# Patient Record
Sex: FEMALE | Race: WHITE | NOT HISPANIC OR LATINO | Employment: OTHER | URBAN - METROPOLITAN AREA
[De-identification: names, ages, dates, MRNs, and addresses within clinical notes are randomized per-mention and may not be internally consistent; named-entity substitution may affect disease eponyms.]

---

## 2017-01-25 ENCOUNTER — OFFICE VISIT (OUTPATIENT)
Dept: MEDICAL GROUP | Facility: PHYSICIAN GROUP | Age: 82
End: 2017-01-25
Payer: MEDICARE

## 2017-01-25 VITALS
OXYGEN SATURATION: 91 % | DIASTOLIC BLOOD PRESSURE: 84 MMHG | HEIGHT: 64 IN | BODY MASS INDEX: 23.56 KG/M2 | HEART RATE: 86 BPM | SYSTOLIC BLOOD PRESSURE: 142 MMHG | WEIGHT: 138 LBS | TEMPERATURE: 98.2 F | RESPIRATION RATE: 18 BRPM

## 2017-01-25 DIAGNOSIS — I27.20 PULMONARY HYPERTENSION (HCC): ICD-10-CM

## 2017-01-25 DIAGNOSIS — G47.34 NOCTURNAL HYPOXIA: ICD-10-CM

## 2017-01-25 DIAGNOSIS — E78.2 MIXED HYPERLIPIDEMIA: ICD-10-CM

## 2017-01-25 DIAGNOSIS — J44.9 CHRONIC OBSTRUCTIVE PULMONARY DISEASE, UNSPECIFIED COPD TYPE (HCC): ICD-10-CM

## 2017-01-25 DIAGNOSIS — I10 ESSENTIAL HYPERTENSION: ICD-10-CM

## 2017-01-25 PROCEDURE — 99214 OFFICE O/P EST MOD 30 MIN: CPT | Performed by: FAMILY MEDICINE

## 2017-01-25 PROCEDURE — G8484 FLU IMMUNIZE NO ADMIN: HCPCS | Performed by: FAMILY MEDICINE

## 2017-01-25 PROCEDURE — G8420 CALC BMI NORM PARAMETERS: HCPCS | Performed by: FAMILY MEDICINE

## 2017-01-25 PROCEDURE — G8432 DEP SCR NOT DOC, RNG: HCPCS | Performed by: FAMILY MEDICINE

## 2017-01-25 PROCEDURE — 1036F TOBACCO NON-USER: CPT | Performed by: FAMILY MEDICINE

## 2017-01-25 PROCEDURE — 4040F PNEUMOC VAC/ADMIN/RCVD: CPT | Performed by: FAMILY MEDICINE

## 2017-01-25 PROCEDURE — 1101F PT FALLS ASSESS-DOCD LE1/YR: CPT | Performed by: FAMILY MEDICINE

## 2017-01-25 NOTE — PROGRESS NOTES
Chief Complaint   Patient presents with   • Follow-Up     labs       HISTORY OF PRESENT ILLNESS: Patient is a 84 y.o. female established patient here today for the following concerns:    1. Mixed hyperlipidemia  Here today for follow up.  She did see cardiology, Dr. Forde, who started Crestor 10 mg.  She is tolerating this well without any noticeable side effects.  No CP.      2. Essential hypertension  Continues on the losartan.  Had some hyperkalemia on one lab test, repeat testing was normal.  She reports that she gets very nervous when she comes to the doctors and that her blood pressure generally has been under 140/90.      3. Pulmonary hypertension (CMS-HCC)  2/2 to severe COPD.  On O2 at night.  Uses it only when she needs it.      4. Chronic obstructive pulmonary disease, unspecified COPD type (CMS-[facility]) 5. Nocturnal hypoxia  Continues on the anoro and albuterol as needed.  No recent exacerbations.  Using O2 only at night or if she is feeling breathless.  Has declined O2 24/7.    Declines further evaluation with pulmonology.        Past Medical, Social, and Family history reviewed and updated in EPIC    Allergies:Review of patient's allergies indicates no known allergies.    Current Outpatient Prescriptions   Medication Sig Dispense Refill   • rosuvastatin (CRESTOR) 10 MG Tab Take 1 Tab by mouth every evening. 90 Tab 3   • Umeclidinium-Vilanterol (ANORO ELLIPTA) 62.5-25 MCG/INH AEROSOL POWDER, BREATH ACTIVATED Inhale 1 Inhalation by mouth every day. 3 Each 3   • albuterol (VENTOLIN OR PROVENTIL) 108 (90 BASE) MCG/ACT Aero Soln inhalation aerosol Inhale 2 Puffs by mouth every 6 hours as needed for Shortness of Breath. 3 Inhaler 3   • losartan (COZAAR) 50 MG Tab Take 1 Tab by mouth every day. 90 Tab 3   • Cholecalciferol (VITAMIN D-3) 5000 UNITS Tab Take  by mouth.     • aspirin 81 MG tablet Take 81 mg by mouth every day.     • FLUZONE HIGH-DOSE 0.5 ML Suspension Prefilled Syringe injection TO BE ADMINISTERED  "BY PHARMACIST FOR IMMUNIZATION  0     No current facility-administered medications for this visit.         ROS:  Review of Systems   Constitutional: Negative for fever, chills, weight loss and malaise/fatigue.   HENT: Negative for ear pain, nosebleeds, congestion, sore throat and neck pain.    Eyes: Negative for blurred vision.   Respiratory: Negative for cough, sputum production, shortness of breath and wheezing.    Cardiovascular: Negative for chest pain, palpitations,  and leg swelling.   Gastrointestinal: Negative for heartburn, nausea, vomiting, diarrhea and abdominal pain.   Genitourinary: Negative for dysuria, urgency and frequency.   Musculoskeletal: Negative for myalgias, back pain and joint pain.   Skin: Negative for rash and itching.   Neurological: Negative for dizziness, tingling, tremors, sensory change, focal weakness and headaches.   Endo/Heme/Allergies: Does not bruise/bleed easily.   Psychiatric/Behavioral: Negative for depression, anxiety, suicidal ideas, insomnia and memory loss.      Exam:  Blood pressure 142/84, pulse 86, temperature 36.8 °C (98.2 °F), resp. rate 18, height 1.626 m (5' 4.02\"), weight 62.596 kg (138 lb), SpO2 91 %.    General:  Well nourished, well developed in NAD  Head is grossly normal.  Neck: Supple without JVD   Pulmonary:  Normal effort.   Cardiovascular: Regular rate  Extremities: no clubbing, cyanosis, or edema.  Psych: affect appropriate      Please note that this dictation was created using voice recognition software. I have made every reasonable attempt to correct obvious errors, but I expect that there are errors of grammar and possibly content that I did not discover before finalizing the note.    Assessment/Plan:  1. Mixed hyperlipidemia  Continue Crestor, labs prior to cards visit in May  - LIPID PROFILE; Future  - COMP METABOLIC PANEL; Future    2. Essential hypertension  Controlled.  Continue losartan    3. Pulmonary hypertension (CMS-HCC)  Continue O2 at night. "     4. Chronic obstructive pulmonary disease, unspecified COPD type (CMS-HCC)  Continue Anoro and albuterol.  Up to date on immunizations per patient received Prevnar 13       5. Nocturnal hypoxia  Continue O2.     Follow up in 6 months, sooner prn.

## 2017-01-25 NOTE — MR AVS SNAPSHOT
"        Nieves Busch   2017 7:40 AM   Office Visit   MRN: [id number]    Department:  [facility]   Dept Phone:  [phone]    Description:  Female : 1932   Provider:  Selene Carrington M.D.           Reason for Visit     Follow-Up labs      Allergies as of 2017     No Known Allergies      You were diagnosed with     Mixed hyperlipidemia   [272.2.ICD-9-CM]       Essential hypertension   [5240306]       Pulmonary hypertension (CMS-HCC)   [927109]       Chronic obstructive pulmonary disease, unspecified COPD type (CMS-HCC)   [9051421]       Nocturnal hypoxia   [945339]         Vital Signs     Blood Pressure Pulse Temperature Respirations Height Weight    142/84 mmHg 86 36.8 °C (98.2 °F) 18 1.626 m (5' 4.02\") 62.596 kg (138 lb)    Body Mass Index Oxygen Saturation Smoking Status             23.68 kg/m2 91% Never Smoker          Basic Information     Date Of Birth Sex Race Ethnicity Preferred Language    1932 Female Unknown Non- English      Your appointments     May 01, 2017  8:15 AM   FOLLOW UP with Korye Forde M.D.   [facility] (--)    [address] [place] [address] [address]  [facility] [address] [phone]            2017  7:00 AM   Established Patient with Selene Carrington M.D.   [address] [address] [phone]           You will be receiving a confirmation call a few days before your appointment from our automated call confirmation system.              Problem List              ICD-10-CM Priority Class Noted - Resolved    Essential hypertension I10   2015 - Present    COPD (chronic obstructive pulmonary disease) (CMS-HCC) J44.9   2015 - Present    Hyperlipidemia E78.5   2015 - Present    Pulmonary hypertension (CMS-HCC) I27.2   2015 - Present    Osteopenia M85.80   2015 - Present    Nocturnal hypoxia G47.34   2016 - Present      "   Health Maintenance        Date Due Completion Dates    IMM DTaP/Tdap/Td Vaccine (1 - Tdap) 12/25/1951 ---    IMM ZOSTER VACCINE 12/25/1992 ---    IMM INFLUENZA (1) 9/1/2016 12/12/2015    IMM PNEUMOCOCCAL 65+ (ADULT) LOW/MEDIUM RISK SERIES (2 of 2 - PCV13) 12/28/2016 12/28/2015    BONE DENSITY 10/16/2020 10/16/2015            Current Immunizations     Influenza Vaccine Adult HD 12/12/2015    Pneumococcal polysaccharide vaccine (PPSV-23) 12/28/2015      Below and/or attached are the medications your provider expects you to take. Review all of your home medications and newly ordered medications with your provider and/or pharmacist. Follow medication instructions as directed by your provider and/or pharmacist. Please keep your medication list with you and share with your provider. Update the information when medications are discontinued, doses are changed, or new medications (including over-the-counter products) are added; and carry medication information at all times in the event of emergency situations     Allergies:  No Known Allergies          Medications  Valid as of: January 25, 2017 -  8:02 AM    Generic Name Brand Name Tablet Size Instructions for use    Albuterol Sulfate (Aero Soln) albuterol 108 (90 BASE) MCG/ACT Inhale 2 Puffs by mouth every 6 hours as needed for Shortness of Breath.        Aspirin (Tab) aspirin 81 MG Take 81 mg by mouth every day.        Cholecalciferol (Tab) Vitamin D-3 5000 UNITS Take  by mouth.        Influenza Vac Split High-Dose (Suspension Prefilled Syringe) FLUZONE HIGH-DOSE 0.5 ML TO BE ADMINISTERED BY PHARMACIST FOR IMMUNIZATION        Losartan Potassium (Tab) COZAAR 50 MG Take 1 Tab by mouth every day.        Rosuvastatin Calcium (Tab) CRESTOR 10 MG Take 1 Tab by mouth every evening.        Umeclidinium-Vilanterol (AEROSOL POWDER, BREATH ACTIVATED) Umeclidinium-Vilanterol 62.5-25 MCG/INH Inhale 1 Inhalation by mouth every day.        .                 Medicines prescribed today  were sent to:     [facility] - [place], [place] - [address]    [address] [address]    Phone: [phone] Fax: [phone]    Open 24 Hours?: No      Medication refill instructions:       If your prescription bottle indicates you have medication refills left, it is not necessary to call your provider’s office. Please contact your pharmacy and they will refill your medication.    If your prescription bottle indicates you do not have any refills left, you may request refills at any time through one of the following ways: The online [email or web address] system (except Urgent Care), by calling your provider’s office, or by asking your pharmacy to contact your provider’s office with a refill request. Medication refills are processed only during regular business hours and may not be available until the next business day. Your provider may request additional information or to have a follow-up visit with you prior to refilling your medication.   *Please Note: Medication refills are assigned a new Rx number when refilled electronically. Your pharmacy may indicate that no refills were authorized even though a new prescription for the same medication is available at the pharmacy. Please request the medicine by name with the pharmacy before contacting your provider for a refill.        Your To Do List     Future Labs/Procedures Complete By Expires    COMP METABOLIC PANEL  As directed 1/25/2018    LIPID PROFILE  As directed 1/25/2018         [email or web address] Status: Patient Declined

## 2017-02-22 NOTE — TELEPHONE ENCOUNTER
Was the patient seen in the last year in this department? Yes     Does patient have an active prescription for medications requested? No     Received Request Via: Pharmacy      Pt met protocol?: Yes, LABS 10/16 OV 1/17

## 2017-04-14 ENCOUNTER — OFFICE VISIT (OUTPATIENT)
Dept: CARDIOLOGY | Facility: MEDICAL CENTER | Age: 82
End: 2017-04-14
Payer: MEDICARE

## 2017-04-14 VITALS
HEART RATE: 94 BPM | OXYGEN SATURATION: 91 % | SYSTOLIC BLOOD PRESSURE: 142 MMHG | HEIGHT: 64 IN | WEIGHT: 134 LBS | BODY MASS INDEX: 22.88 KG/M2 | DIASTOLIC BLOOD PRESSURE: 70 MMHG

## 2017-04-14 DIAGNOSIS — I27.20 PULMONARY HYPERTENSION (HCC): ICD-10-CM

## 2017-04-14 DIAGNOSIS — E78.2 MIXED HYPERLIPIDEMIA: ICD-10-CM

## 2017-04-14 DIAGNOSIS — M85.80 OSTEOPENIA: ICD-10-CM

## 2017-04-14 DIAGNOSIS — G47.34 NOCTURNAL HYPOXIA: ICD-10-CM

## 2017-04-14 DIAGNOSIS — J44.9 CHRONIC OBSTRUCTIVE PULMONARY DISEASE, UNSPECIFIED COPD TYPE (HCC): ICD-10-CM

## 2017-04-14 DIAGNOSIS — I10 ESSENTIAL HYPERTENSION: ICD-10-CM

## 2017-04-14 PROCEDURE — 4040F PNEUMOC VAC/ADMIN/RCVD: CPT | Performed by: INTERNAL MEDICINE

## 2017-04-14 PROCEDURE — G8432 DEP SCR NOT DOC, RNG: HCPCS | Performed by: INTERNAL MEDICINE

## 2017-04-14 PROCEDURE — 1036F TOBACCO NON-USER: CPT | Performed by: INTERNAL MEDICINE

## 2017-04-14 PROCEDURE — 99214 OFFICE O/P EST MOD 30 MIN: CPT | Performed by: INTERNAL MEDICINE

## 2017-04-14 PROCEDURE — 1101F PT FALLS ASSESS-DOCD LE1/YR: CPT | Performed by: INTERNAL MEDICINE

## 2017-04-14 PROCEDURE — G8420 CALC BMI NORM PARAMETERS: HCPCS | Performed by: INTERNAL MEDICINE

## 2017-04-14 RX ORDER — LOSARTAN POTASSIUM 50 MG/1
TABLET ORAL
Qty: 90 TAB | Refills: 1 | Status: SHIPPED | OUTPATIENT
Start: 2017-04-14 | End: 2017-10-12 | Stop reason: SDUPTHER

## 2017-04-14 ASSESSMENT — ENCOUNTER SYMPTOMS
CHILLS: 0
LOSS OF CONSCIOUSNESS: 0
STRIDOR: 0
BRUISES/BLEEDS EASILY: 0
SORE THROAT: 0
WEAKNESS: 0
PALPITATIONS: 0
CLAUDICATION: 0
SPUTUM PRODUCTION: 0
GASTROINTESTINAL NEGATIVE: 1
COUGH: 0
PND: 0
FEVER: 0
SHORTNESS OF BREATH: 0
CARDIOVASCULAR NEGATIVE: 1
DIZZINESS: 0
HEMOPTYSIS: 0
NEUROLOGICAL NEGATIVE: 1
CONSTITUTIONAL NEGATIVE: 1
EYES NEGATIVE: 1
MUSCULOSKELETAL NEGATIVE: 1
WHEEZING: 0
ORTHOPNEA: 0
RESPIRATORY NEGATIVE: 1

## 2017-04-14 NOTE — TELEPHONE ENCOUNTER
Refill X 6 months, sent to pharmacy.Pt. Seen in the last 6 months per protocol.   Lab Results   Component Value Date/Time    SODIUM 136 10/25/2016 08:04 AM    POTASSIUM 4.5 10/25/2016 08:04 AM    CHLORIDE 100 10/25/2016 08:04 AM    CO2 28 10/25/2016 08:04 AM    GLUCOSE 115* 10/25/2016 08:04 AM    BUN 22 10/25/2016 08:04 AM    CREATININE 0.91 10/25/2016 08:04 AM    BUN-CREATININE RATIO 24 10/12/2016 08:34 AM

## 2017-04-14 NOTE — TELEPHONE ENCOUNTER
Was the patient seen in the last year in this department? Yes     Does patient have an active prescription for medications requested? No     Received Request Via: Pharmacy      Pt met protocol?: Yes pt last ov 1/2017   BP Readings from Last 1 Encounters:   04/14/17 142/70

## 2017-04-14 NOTE — Clinical Note
[facility]   [address]  [place] [address]  Phone: [phone]  Fax: [phone]              Nieves Busch  12/25/1932    Encounter Date: 4/14/2017    Korey Forde M.D.          PROGRESS NOTE:  Subjective:   Nieves Busch is a 84 y.o. female who presents today as a follow-up for her WHO type III pulmonary hypertension. She still is likely desaturating during the day and her resting O2 sat is 91% here. She does not wear oxygen during the day or during ambulation but does wear it at night. She's had no chest pain but is somewhat short of breath with exertion. He did start the Crestor and is taking it with no issues. Of note she has never smoked.    Past Medical History   Diagnosis Date   • Essential hypertension 9/25/2015     Past Surgical History   Procedure Laterality Date   • Cataract phaco with iol  8/6/2013     Performed by Wagner Gomez M.D. at [facility]   • Cataract phaco with iol  8/20/2013     Performed by Wagner Gomez M.D. at [facility]     Family History   Problem Relation Age of Onset   • Cancer Daughter    • Cancer Father      Lungs   • Other Father      Tobbacco   • Alcohol/Drug Mother      History   Smoking status   • Never Smoker    Smokeless tobacco   • Never Used     Comment: significant 2nd hand smoke exposure.      No Known Allergies  Outpatient Encounter Prescriptions as of 4/14/2017   Medication Sig Dispense Refill   • Umeclidinium-Vilanterol (ANORO ELLIPTA) 62.5-25 MCG/INH AEROSOL POWDER, BREATH ACTIVATED Take 1 Inhalation by mouth every day. 3 Each 1   • rosuvastatin (CRESTOR) 10 MG Tab Take 1 Tab by mouth every evening. 90 Tab 3   • albuterol (VENTOLIN OR PROVENTIL) 108 (90 BASE) MCG/ACT Aero Soln inhalation aerosol Inhale 2 Puffs by mouth every 6 hours as needed for Shortness of Breath. 3 Inhaler 3   • losartan (COZAAR) 50 MG Tab Take 1 Tab by mouth every day. 90 Tab 3   •  "Cholecalciferol (VITAMIN D-3) 5000 UNITS Tab Take  by mouth.     • aspirin 81 MG tablet Take 81 mg by mouth every day.     • FLUZONE HIGH-DOSE 0.5 ML Suspension Prefilled Syringe injection TO BE ADMINISTERED BY PHARMACIST FOR IMMUNIZATION  0     No facility-administered encounter medications on file as of 4/14/2017.     Review of Systems   Constitutional: Negative.  Negative for fever, chills and malaise/fatigue.   HENT: Negative.  Negative for sore throat.    Eyes: Negative.    Respiratory: Negative.  Negative for cough, hemoptysis, sputum production, shortness of breath, wheezing and stridor.    Cardiovascular: Negative.  Negative for chest pain, palpitations, orthopnea, claudication, leg swelling and PND.   Gastrointestinal: Negative.    Genitourinary: Negative.    Musculoskeletal: Negative.    Skin: Negative.    Neurological: Negative.  Negative for dizziness, loss of consciousness and weakness.   Endo/Heme/Allergies: Negative.  Does not bruise/bleed easily.   All other systems reviewed and are negative.       Objective:   /70 mmHg  Pulse 94  Ht 1.626 m (5' 4.02\")  Wt 60.782 kg (134 lb)  BMI 22.99 kg/m2  SpO2 91%    Physical Exam   Constitutional: She is oriented to person, place, and time. She appears well-developed and well-nourished. No distress.   HENT:   Head: Normocephalic.   Mouth/Throat: Oropharynx is clear and moist.   Eyes: EOM are normal. Pupils are equal, round, and reactive to light. Right eye exhibits no discharge. Left eye exhibits no discharge. No scleral icterus.   Neck: Normal range of motion. Neck supple. No JVD present. No tracheal deviation present.   Cardiovascular: Normal rate, regular rhythm, S1 normal, S2 normal, normal heart sounds, intact distal pulses and normal pulses.  Exam reveals no gallop, no S3, no S4 and no friction rub.    No murmur heard.   No systolic murmur is present    No diastolic murmur is present   Pulses:       Carotid pulses are 2+ on the right side, and " 2+ on the left side.       Radial pulses are 2+ on the right side, and 2+ on the left side.        Dorsalis pedis pulses are 2+ on the right side, and 2+ on the left side.        Posterior tibial pulses are 2+ on the right side, and 2+ on the left side.   Pulmonary/Chest: Effort normal and breath sounds normal. No respiratory distress. She has no wheezes. She has no rales.   Abdominal: Soft. Bowel sounds are normal. She exhibits no distension and no mass. There is no tenderness. There is no rebound and no guarding.   Musculoskeletal: She exhibits no edema.   Neurological: She is alert and oriented to person, place, and time. No cranial nerve deficit.   Skin: Skin is warm and dry. She is not diaphoretic. No pallor.   Psychiatric: She has a normal mood and affect. Her behavior is normal. Judgment and thought content normal.   Nursing note and vitals reviewed.      Assessment:     1. Chronic obstructive pulmonary disease, unspecified COPD type (CMS-HCC)     2. Essential hypertension     3. Mixed hyperlipidemia     4. Pulmonary hypertension (CMS-HCC)     5. Nocturnal hypoxia     6. Osteopenia         Medical Decision Making:  Today's Assessment / Status / Plan:     A 4-year-old female with COPD and no smoking history. We will keep her on the Crestor and her aspirin. I will see her back in 6 months.    Thank for you allowing me to take part in your patient's care, please call should you have any questions or would like to discuss this patient.        Selene Carrington M.D.  [address] [place] [id number]  VIA In Basket

## 2017-04-14 NOTE — PROGRESS NOTES
Subjective:   Nieves Busch is a 84 y.o. female who presents today as a follow-up for her WHO type III pulmonary hypertension. She still is likely desaturating during the day and her resting O2 sat is 91% here. She does not wear oxygen during the day or during ambulation but does wear it at night. She's had no chest pain but is somewhat short of breath with exertion. He did start the Crestor and is taking it with no issues. Of note she has never smoked.    Past Medical History   Diagnosis Date   • Essential hypertension 9/25/2015     Past Surgical History   Procedure Laterality Date   • Cataract phaco with iol  8/6/2013     Performed by Wagner Gomez M.D. at [facility]   • Cataract phaco with iol  8/20/2013     Performed by Wagner Gomez M.D. at [facility]     Family History   Problem Relation Age of Onset   • Cancer Daughter    • Cancer Father      Lungs   • Other Father      Tobbacco   • Alcohol/Drug Mother      History   Smoking status   • Never Smoker    Smokeless tobacco   • Never Used     Comment: significant 2nd hand smoke exposure.      No Known Allergies  Outpatient Encounter Prescriptions as of 4/14/2017   Medication Sig Dispense Refill   • Umeclidinium-Vilanterol (ANORO ELLIPTA) 62.5-25 MCG/INH AEROSOL POWDER, BREATH ACTIVATED Take 1 Inhalation by mouth every day. 3 Each 1   • rosuvastatin (CRESTOR) 10 MG Tab Take 1 Tab by mouth every evening. 90 Tab 3   • albuterol (VENTOLIN OR PROVENTIL) 108 (90 BASE) MCG/ACT Aero Soln inhalation aerosol Inhale 2 Puffs by mouth every 6 hours as needed for Shortness of Breath. 3 Inhaler 3   • losartan (COZAAR) 50 MG Tab Take 1 Tab by mouth every day. 90 Tab 3   • Cholecalciferol (VITAMIN D-3) 5000 UNITS Tab Take  by mouth.     • aspirin 81 MG tablet Take 81 mg by mouth every day.     • FLUZONE HIGH-DOSE 0.5 ML Suspension Prefilled Syringe injection TO BE ADMINISTERED BY PHARMACIST FOR IMMUNIZATION  0     No facility-administered  "encounter medications on file as of 4/14/2017.     Review of Systems   Constitutional: Negative.  Negative for fever, chills and malaise/fatigue.   HENT: Negative.  Negative for sore throat.    Eyes: Negative.    Respiratory: Negative.  Negative for cough, hemoptysis, sputum production, shortness of breath, wheezing and stridor.    Cardiovascular: Negative.  Negative for chest pain, palpitations, orthopnea, claudication, leg swelling and PND.   Gastrointestinal: Negative.    Genitourinary: Negative.    Musculoskeletal: Negative.    Skin: Negative.    Neurological: Negative.  Negative for dizziness, loss of consciousness and weakness.   Endo/Heme/Allergies: Negative.  Does not bruise/bleed easily.   All other systems reviewed and are negative.       Objective:   /70 mmHg  Pulse 94  Ht 1.626 m (5' 4.02\")  Wt 60.782 kg (134 lb)  BMI 22.99 kg/m2  SpO2 91%    Physical Exam   Constitutional: She is oriented to person, place, and time. She appears well-developed and well-nourished. No distress.   HENT:   Head: Normocephalic.   Mouth/Throat: Oropharynx is clear and moist.   Eyes: EOM are normal. Pupils are equal, round, and reactive to light. Right eye exhibits no discharge. Left eye exhibits no discharge. No scleral icterus.   Neck: Normal range of motion. Neck supple. No JVD present. No tracheal deviation present.   Cardiovascular: Normal rate, regular rhythm, S1 normal, S2 normal, normal heart sounds, intact distal pulses and normal pulses.  Exam reveals no gallop, no S3, no S4 and no friction rub.    No murmur heard.   No systolic murmur is present    No diastolic murmur is present   Pulses:       Carotid pulses are 2+ on the right side, and 2+ on the left side.       Radial pulses are 2+ on the right side, and 2+ on the left side.        Dorsalis pedis pulses are 2+ on the right side, and 2+ on the left side.        Posterior tibial pulses are 2+ on the right side, and 2+ on the left side.   Pulmonary/Chest: " Effort normal and breath sounds normal. No respiratory distress. She has no wheezes. She has no rales.   Abdominal: Soft. Bowel sounds are normal. She exhibits no distension and no mass. There is no tenderness. There is no rebound and no guarding.   Musculoskeletal: She exhibits no edema.   Neurological: She is alert and oriented to person, place, and time. No cranial nerve deficit.   Skin: Skin is warm and dry. She is not diaphoretic. No pallor.   Psychiatric: She has a normal mood and affect. Her behavior is normal. Judgment and thought content normal.   Nursing note and vitals reviewed.      Assessment:     1. Chronic obstructive pulmonary disease, unspecified COPD type (CMS-HCC)     2. Essential hypertension     3. Mixed hyperlipidemia     4. Pulmonary hypertension (CMS-HCC)     5. Nocturnal hypoxia     6. Osteopenia         Medical Decision Making:  Today's Assessment / Status / Plan:     A 4-year-old female with COPD and no smoking history. We will keep her on the Crestor and her aspirin. I will see her back in 6 months.    Thank for you allowing me to take part in your patient's care, please call should you have any questions or would like to discuss this patient.

## 2017-04-14 NOTE — MR AVS SNAPSHOT
"        Nieves ROEL Busch   2017 9:00 AM   Office Visit   MRN: [id number]    Department:  [facility]   Dept Phone:  [phone]    Description:  Female : 1932   Provider:  Korey Forde M.D.           Reason for Visit     Follow-Up           Allergies as of 2017     No Known Allergies      You were diagnosed with     Chronic obstructive pulmonary disease, unspecified COPD type (CMS-[facility])   [3956156]       Essential hypertension   [2100981]       Mixed hyperlipidemia   [272.2.ICD-9-CM]       Pulmonary hypertension (CMS-[facility])   [818668]       Nocturnal hypoxia   [823766]       Osteopenia   [487107]         Vital Signs     Blood Pressure Pulse Height Weight Body Mass Index Oxygen Saturation    142/70 mmHg 94 1.626 m (5' 4.02\") 60.782 kg (134 lb) 22.99 kg/m2 91%    Smoking Status                   Never Smoker            Basic Information     Date Of Birth Sex Race Ethnicity Preferred Language    1932 Female White Non- English      Your appointments     2017  7:00 AM   Established Patient with Selene Carrington M.D.   [facility] [address] [id number] [phone]           You will be receiving a confirmation call a few days before your appointment from our automated call confirmation system.              Problem List              ICD-10-CM Priority Class Noted - Resolved    Essential hypertension I10   2015 - Present    COPD (chronic obstructive pulmonary disease) (CMS-HCC) J44.9   2015 - Present    Hyperlipidemia E78.5   2015 - Present    Pulmonary hypertension (CMS-HCC) I27.2   2015 - Present    Osteopenia M85.80   2015 - Present    Nocturnal hypoxia G47.34   2016 - Present      Health Maintenance        Date Due Completion Dates    IMM DTaP/Tdap/Td Vaccine (1 - Tdap) 1951 ---    IMM ZOSTER VACCINE 1992 ---    IMM PNEUMOCOCCAL 65+ (ADULT) LOW/MEDIUM RISK SERIES (2 of 2 - " PCV13) 12/28/2016 12/28/2015    BONE DENSITY 10/16/2020 10/16/2015            Current Immunizations     Influenza Vaccine Adult HD 12/12/2015    Pneumococcal polysaccharide vaccine (PPSV-23) 12/28/2015      Below and/or attached are the medications your provider expects you to take. Review all of your home medications and newly ordered medications with your provider and/or pharmacist. Follow medication instructions as directed by your provider and/or pharmacist. Please keep your medication list with you and share with your provider. Update the information when medications are discontinued, doses are changed, or new medications (including over-the-counter products) are added; and carry medication information at all times in the event of emergency situations     Allergies:  No Known Allergies          Medications  Valid as of: April 14, 2017 -  9:26 AM    Generic Name Brand Name Tablet Size Instructions for use    Albuterol Sulfate (Aero Soln) albuterol 108 (90 BASE) MCG/ACT Inhale 2 Puffs by mouth every 6 hours as needed for Shortness of Breath.        Aspirin (Tab) aspirin 81 MG Take 81 mg by mouth every day.        Cholecalciferol (Tab) Vitamin D-3 5000 UNITS Take  by mouth.        Influenza Vac Split High-Dose (Suspension Prefilled Syringe) FLUZONE HIGH-DOSE 0.5 ML TO BE ADMINISTERED BY PHARMACIST FOR IMMUNIZATION        Losartan Potassium (Tab) COZAAR 50 MG Take 1 Tab by mouth every day.        Rosuvastatin Calcium (Tab) CRESTOR 10 MG Take 1 Tab by mouth every evening.        Umeclidinium-Vilanterol (AEROSOL POWDER, BREATH ACTIVATED) Umeclidinium-Vilanterol 62.5-25 MCG/INH Take 1 Inhalation by mouth every day.        .                 Medicines prescribed today were sent to:     [email or web address] - [place], [place] - [address] [place] [address]    Phone: [phone] Fax: [phone]    Open 24 Hours?: No      Medication refill instructions:       If your  prescription bottle indicates you have medication refills left, it is not necessary to call your provider’s office. Please contact your pharmacy and they will refill your medication.    If your prescription bottle indicates you do not have any refills left, you may request refills at any time through one of the following ways: The online [employer] system (except Urgent Care), by calling your provider’s office, or by asking your pharmacy to contact your provider’s office with a refill request. Medication refills are processed only during regular business hours and may not be available until the next business day. Your provider may request additional information or to have a follow-up visit with you prior to refilling your medication.   *Please Note: Medication refills are assigned a new Rx number when refilled electronically. Your pharmacy may indicate that no refills were authorized even though a new prescription for the same medication is available at the pharmacy. Please request the medicine by name with the pharmacy before contacting your provider for a refill.           Servicefulhart Status: Patient Declined

## 2017-07-20 ENCOUNTER — HOSPITAL ENCOUNTER (OUTPATIENT)
Dept: LAB | Facility: MEDICAL CENTER | Age: 82
End: 2017-07-20
Attending: FAMILY MEDICINE
Payer: MEDICARE

## 2017-07-20 DIAGNOSIS — E78.2 MIXED HYPERLIPIDEMIA: ICD-10-CM

## 2017-07-20 LAB
ALBUMIN SERPL BCP-MCNC: 4 G/DL (ref 3.2–4.9)
ALBUMIN/GLOB SERPL: 1.1 G/DL
ALP SERPL-CCNC: 98 U/L (ref 30–99)
ALT SERPL-CCNC: 21 U/L (ref 2–50)
ANION GAP SERPL CALC-SCNC: 10 MMOL/L (ref 0–11.9)
AST SERPL-CCNC: 22 U/L (ref 12–45)
BILIRUB SERPL-MCNC: 0.7 MG/DL (ref 0.1–1.5)
BUN SERPL-MCNC: 23 MG/DL (ref 8–22)
CALCIUM SERPL-MCNC: 10.1 MG/DL (ref 8.5–10.5)
CHLORIDE SERPL-SCNC: 100 MMOL/L (ref 96–112)
CHOLEST SERPL-MCNC: 187 MG/DL (ref 100–199)
CO2 SERPL-SCNC: 27 MMOL/L (ref 20–33)
CREAT SERPL-MCNC: 0.94 MG/DL (ref 0.5–1.4)
GFR SERPL CREATININE-BSD FRML MDRD: 57 ML/MIN/1.73 M 2
GLOBULIN SER CALC-MCNC: 3.5 G/DL (ref 1.9–3.5)
GLUCOSE SERPL-MCNC: 103 MG/DL (ref 65–99)
HDLC SERPL-MCNC: 95 MG/DL
LDLC SERPL CALC-MCNC: 76 MG/DL
POTASSIUM SERPL-SCNC: 4.9 MMOL/L (ref 3.6–5.5)
PROT SERPL-MCNC: 7.5 G/DL (ref 6–8.2)
SODIUM SERPL-SCNC: 137 MMOL/L (ref 135–145)
TRIGL SERPL-MCNC: 78 MG/DL (ref 0–149)

## 2017-07-20 PROCEDURE — 36415 COLL VENOUS BLD VENIPUNCTURE: CPT

## 2017-07-20 PROCEDURE — 80053 COMPREHEN METABOLIC PANEL: CPT

## 2017-07-20 PROCEDURE — 80061 LIPID PANEL: CPT

## 2017-07-21 ENCOUNTER — TELEPHONE (OUTPATIENT)
Dept: MEDICAL GROUP | Facility: PHYSICIAN GROUP | Age: 82
End: 2017-07-21

## 2017-07-25 ENCOUNTER — OFFICE VISIT (OUTPATIENT)
Dept: MEDICAL GROUP | Facility: PHYSICIAN GROUP | Age: 82
End: 2017-07-25
Payer: MEDICARE

## 2017-07-25 VITALS
HEART RATE: 98 BPM | HEIGHT: 64 IN | BODY MASS INDEX: 23.05 KG/M2 | WEIGHT: 135 LBS | SYSTOLIC BLOOD PRESSURE: 130 MMHG | TEMPERATURE: 98.2 F | OXYGEN SATURATION: 91 % | RESPIRATION RATE: 18 BRPM | DIASTOLIC BLOOD PRESSURE: 70 MMHG

## 2017-07-25 DIAGNOSIS — J96.11 CHRONIC RESPIRATORY FAILURE WITH HYPOXIA (HCC): ICD-10-CM

## 2017-07-25 DIAGNOSIS — J44.9 CHRONIC OBSTRUCTIVE PULMONARY DISEASE, UNSPECIFIED COPD TYPE (HCC): ICD-10-CM

## 2017-07-25 DIAGNOSIS — E78.2 MIXED HYPERLIPIDEMIA: ICD-10-CM

## 2017-07-25 DIAGNOSIS — Z78.0 POSTMENOPAUSAL ESTROGEN DEFICIENCY: ICD-10-CM

## 2017-07-25 DIAGNOSIS — G47.34 NOCTURNAL HYPOXIA: ICD-10-CM

## 2017-07-25 DIAGNOSIS — I10 ESSENTIAL HYPERTENSION: ICD-10-CM

## 2017-07-25 DIAGNOSIS — I27.20 PULMONARY HYPERTENSION (HCC): ICD-10-CM

## 2017-07-25 DIAGNOSIS — M85.89 OSTEOPENIA OF MULTIPLE SITES: ICD-10-CM

## 2017-07-25 PROBLEM — J96.10 CHRONIC RESPIRATORY FAILURE (HCC): Status: ACTIVE | Noted: 2017-07-25

## 2017-07-25 PROCEDURE — 99213 OFFICE O/P EST LOW 20 MIN: CPT | Performed by: FAMILY MEDICINE

## 2017-07-25 ASSESSMENT — PATIENT HEALTH QUESTIONNAIRE - PHQ9: CLINICAL INTERPRETATION OF PHQ2 SCORE: 0

## 2017-07-25 NOTE — PROGRESS NOTES
Chief Complaint   Patient presents with   • Follow-Up     labs       HISTORY OF PRESENT ILLNESS: Patient is a 84 y.o. female established patient here today for the following concerns:    This is a very pleasant 84 year old female with history of HTN, Pulmonary HTN 2/2 to COPD with nocturnal hypoxia and baseline dyspnea as well hyperlipidemia.  She reports feeling well at this time with no recent COPD exacerbations.  She has recently seen her cardiologist who had appropriately suggested that Ms. Busch may benefit from oxygen 24/7 as she is likely desating with ambulation.  She reports that she is not ready for this and does not want to be tested.  She continues O2 at night.  She has been adherent to her inhaled medication although she has been a bit frightened to use her rescue inhalers as she does not know how they will affect her.  She has not had any exacerbations, denies any wheezing, cough, increase in sputum.  She reports mostly just dyspnea which she feels is relieved with resting.      In addition she is tolerating the Crestor without myalgias.  Lipids are well controlled on most recent labs.  She continues on ASA 81 mg and her losartan for BP control.      Renal function appears to be at baseline.  She continues to work on her hydration.    Past Medical, Social, and Family history reviewed and updated in EPIC    Allergies:Review of patient's allergies indicates no known allergies.    Current Outpatient Prescriptions   Medication Sig Dispense Refill   • losartan (COZAAR) 50 MG Tab TAKE 1 TABLET DAILY 90 Tab 1   • Umeclidinium-Vilanterol (ANORO ELLIPTA) 62.5-25 MCG/INH AEROSOL POWDER, BREATH ACTIVATED Take 1 Inhalation by mouth every day. 3 Each 1   • rosuvastatin (CRESTOR) 10 MG Tab Take 1 Tab by mouth every evening. 90 Tab 3   • albuterol (VENTOLIN OR PROVENTIL) 108 (90 BASE) MCG/ACT Aero Soln inhalation aerosol Inhale 2 Puffs by mouth every 6 hours as needed for Shortness of Breath. 3 Inhaler 3   •  "Cholecalciferol (VITAMIN D-3) 5000 UNITS Tab Take  by mouth.     • aspirin 81 MG tablet Take 81 mg by mouth every day.     • FLUZONE HIGH-DOSE 0.5 ML Suspension Prefilled Syringe injection TO BE ADMINISTERED BY PHARMACIST FOR IMMUNIZATION  0     No current facility-administered medications for this visit.         ROS:  Review of Systems   Constitutional: Negative for fever, chills, weight loss and malaise/fatigue.   HENT: Negative for ear pain, nosebleeds, congestion, sore throat and neck pain.    Eyes: Negative for blurred vision.   Respiratory: Negative for cough, sputum production, shortness of breath and wheezing.    Cardiovascular: Negative for chest pain, palpitations,  and leg swelling.   Gastrointestinal: Negative for heartburn, nausea, vomiting, diarrhea and abdominal pain.   Genitourinary: Negative for dysuria, urgency and frequency.   Musculoskeletal: Negative for myalgias, back pain and joint pain.   Skin: Negative for rash and itching.   Neurological: Negative for dizziness, tingling, tremors, sensory change, focal weakness and headaches.   Endo/Heme/Allergies: Does not bruise/bleed easily.   Psychiatric/Behavioral: Negative for depression, anxiety, suicidal ideas, insomnia and memory loss.      Exam:  Blood pressure 130/70, pulse 98, temperature 36.8 °C (98.2 °F), resp. rate 18, height 1.626 m (5' 4.02\"), weight 61.236 kg (135 lb), SpO2 91 %.    General:  Well nourished, well developed in NAD  Head is grossly normal.  Neck: Supple without JVD   Pulmonary:  Normal effort. diminshed breath sounds bilaterally, no wheezing.  Cardiovascular: Regular rate  Extremities: no clubbing, cyanosis, or edema.  Psych: affect appropriate      Please note that this dictation was created using voice recognition software. I have made every reasonable attempt to correct obvious errors, but I expect that there are errors of grammar and possibly content that I did not discover before finalizing the " note.    Assessment/Plan:  1. Postmenopausal estrogen deficiency  Check bone density   - DS-BONE DENSITY STUDY (DEXA); Future    2. Chronic obstructive pulmonary disease, unspecified COPD type (CMS-HCC)  Continue inhaled medications, flu vaccine in the fall and late winter.     3. Essential hypertension  Continue losartan     4. Chronic respiratory failure with hypoxia (CMS-HCC)  Continue O2 at night.  Offered to test for need for daytime use, patient declines.     5. Nocturnal hypoxia  Continue O2    6. Pulmonary hypertension (CMS-HCC)  Continue O2, inhaled medications     7. Mixed hyperlipidemia  Continue Crestor.    8. Osteopenia of multiple sites  Check bone density, continue Vit D, weight bearing activity.     6 month follow up

## 2017-07-25 NOTE — MR AVS SNAPSHOT
"        Nieves ROEL BakerBusch   2017 7:00 AM   Office Visit   MRN: [id number]    Department:  [facility]   Dept Phone:  [phone]    Description:  Female : 1932   Provider:  Selene Carrington M.D.           Reason for Visit     Follow-Up labs      Allergies as of 2017     No Known Allergies      You were diagnosed with     Postmenopausal estrogen deficiency   [314403]         Vital Signs     Blood Pressure Pulse Temperature Respirations Height Weight    130/70 mmHg 98 36.8 °C (98.2 °F) 18 1.626 m (5' 4.02\") 61.236 kg (135 lb)    Body Mass Index Oxygen Saturation Smoking Status             23.16 kg/m2 91% Never Smoker          Basic Information     Date Of Birth Sex Race Ethnicity Preferred Language    1932 Female White Non- English      Your appointments     2017  7:45 AM   FOLLOW UP with Korey Forde M.D.   [facility] (--)    [address], [address]  [facility] [id number] [phone]              Problem List              ICD-10-CM Priority Class Noted - Resolved    Essential hypertension I10   2015 - Present    COPD (chronic obstructive pulmonary disease) (CMS-HCC) J44.9   2015 - Present    Hyperlipidemia E78.5   2015 - Present    Pulmonary hypertension (CMS-[facility]) I27.2   2015 - Present    Osteopenia M85.80   2015 - Present    Nocturnal hypoxia G47.34   2016 - Present      Health Maintenance        Date Due Completion Dates    IMM DTaP/Tdap/Td Vaccine (1 - Tdap) 1951 ---    IMM ZOSTER VACCINE 1992 ---    IMM PNEUMOCOCCAL 65+ (ADULT) LOW/MEDIUM RISK SERIES (2 of 2 - PCV13) 2016    IMM INFLUENZA (1) 2015    BONE DENSITY 10/16/2020 10/16/2015            Current Immunizations     Influenza Vaccine Adult HD 2015    Pneumococcal polysaccharide vaccine (PPSV-23) 2015      Below and/or attached are the medications your provider expects you to " take. Review all of your home medications and newly ordered medications with your provider and/or pharmacist. Follow medication instructions as directed by your provider and/or pharmacist. Please keep your medication list with you and share with your provider. Update the information when medications are discontinued, doses are changed, or new medications (including over-the-counter products) are added; and carry medication information at all times in the event of emergency situations     Allergies:  No Known Allergies          Medications  Valid as of: July 25, 2017 -  7:21 AM    Generic Name Brand Name Tablet Size Instructions for use    Albuterol Sulfate (Aero Soln) albuterol 108 (90 BASE) MCG/ACT Inhale 2 Puffs by mouth every 6 hours as needed for Shortness of Breath.        Aspirin (Tab) aspirin 81 MG Take 81 mg by mouth every day.        Cholecalciferol (Tab) Vitamin D-3 5000 UNITS Take  by mouth.        Influenza Vac Split High-Dose (Suspension Prefilled Syringe) FLUZONE HIGH-DOSE 0.5 ML TO BE ADMINISTERED BY PHARMACIST FOR IMMUNIZATION        Losartan Potassium (Tab) COZAAR 50 MG TAKE 1 TABLET DAILY        Rosuvastatin Calcium (Tab) CRESTOR 10 MG Take 1 Tab by mouth every evening.        Umeclidinium-Vilanterol (AEROSOL POWDER, BREATH ACTIVATED) Umeclidinium-Vilanterol 62.5-25 MCG/INH Take 1 Inhalation by mouth every day.        .                 Medicines prescribed today were sent to:     [facility] - [address] [address]    Phone: [phone] Fax: [phone]    Open 24 Hours?: No      Medication refill instructions:       If your prescription bottle indicates you have medication refills left, it is not necessary to call your provider’s office. Please contact your pharmacy and they will refill your medication.    If your prescription bottle indicates you do not have any refills left, you may request refills at any time  through one of the following ways: The online [employer] system (except Urgent Care), by calling your provider’s office, or by asking your pharmacy to contact your provider’s office with a refill request. Medication refills are processed only during regular business hours and may not be available until the next business day. Your provider may request additional information or to have a follow-up visit with you prior to refilling your medication.   *Please Note: Medication refills are assigned a new Rx number when refilled electronically. Your pharmacy may indicate that no refills were authorized even though a new prescription for the same medication is available at the pharmacy. Please request the medicine by name with the pharmacy before contacting your provider for a refill.        Your To Do List     Future Labs/Procedures Complete By Expires    DS-BONE DENSITY STUDY (DEXA)  As directed 7/25/2018         1[employer] Status: Patient Declined

## 2017-08-09 ENCOUNTER — OFFICE VISIT (OUTPATIENT)
Dept: MEDICAL GROUP | Facility: PHYSICIAN GROUP | Age: 82
End: 2017-08-09
Payer: MEDICARE

## 2017-08-09 VITALS
DIASTOLIC BLOOD PRESSURE: 80 MMHG | RESPIRATION RATE: 20 BRPM | BODY MASS INDEX: 23.05 KG/M2 | OXYGEN SATURATION: 83 % | SYSTOLIC BLOOD PRESSURE: 132 MMHG | HEIGHT: 64 IN | WEIGHT: 135 LBS | TEMPERATURE: 98.1 F | HEART RATE: 106 BPM

## 2017-08-09 DIAGNOSIS — J96.11 CHRONIC RESPIRATORY FAILURE WITH HYPOXIA (HCC): ICD-10-CM

## 2017-08-09 DIAGNOSIS — J44.9 CHRONIC OBSTRUCTIVE PULMONARY DISEASE, UNSPECIFIED COPD TYPE (HCC): ICD-10-CM

## 2017-08-09 DIAGNOSIS — G47.34 NOCTURNAL HYPOXIA: ICD-10-CM

## 2017-08-09 DIAGNOSIS — I27.20 PULMONARY HYPERTENSION (HCC): ICD-10-CM

## 2017-08-09 PROCEDURE — 99214 OFFICE O/P EST MOD 30 MIN: CPT | Performed by: FAMILY MEDICINE

## 2017-08-09 NOTE — PROGRESS NOTES
Chief Complaint   Patient presents with   • Other     O2 check       HISTORY OF PRESENT ILLNESS: Patient is a 84 y.o. female established patient here today for the following concerns:    1. Chronic respiratory failure with hypoxia (CMS-HCC)  2. Pulmonary hypertension (CMS-HCC)  3. Nocturnal hypoxia  4. Chronic obstructive pulmonary disease, unspecified COPD type (CMS-HCC)  Nieves is here now for follow up and is more willing to consider daytime use of oxygen.  She reports that she is finding that she is getting breathless quickly with exertion.  She is using Oxygen at night.  She has several questions on how to use the canula and how she would need to use the oxygen.  She has hx of COPD, pulmonary HTN and nocturnal hypoxia as well as respiratory failure on ambulation.        Past Medical, Social, and Family history reviewed and updated in EPIC    Allergies:Review of patient's allergies indicates no known allergies.    Current Outpatient Prescriptions   Medication Sig Dispense Refill   • losartan (COZAAR) 50 MG Tab TAKE 1 TABLET DAILY 90 Tab 1   • Umeclidinium-Vilanterol (ANORO ELLIPTA) 62.5-25 MCG/INH AEROSOL POWDER, BREATH ACTIVATED Take 1 Inhalation by mouth every day. 3 Each 1   • rosuvastatin (CRESTOR) 10 MG Tab Take 1 Tab by mouth every evening. 90 Tab 3   • albuterol (VENTOLIN OR PROVENTIL) 108 (90 BASE) MCG/ACT Aero Soln inhalation aerosol Inhale 2 Puffs by mouth every 6 hours as needed for Shortness of Breath. 3 Inhaler 3   • Cholecalciferol (VITAMIN D-3) 5000 UNITS Tab Take  by mouth.     • aspirin 81 MG tablet Take 81 mg by mouth every day.     • FLUZONE HIGH-DOSE 0.5 ML Suspension Prefilled Syringe injection TO BE ADMINISTERED BY PHARMACIST FOR IMMUNIZATION  0     No current facility-administered medications for this visit.         ROS:  Review of Systems   Constitutional: Negative for fever, chills, weight loss and malaise/fatigue.   HENT: Negative for ear pain, nosebleeds, congestion, sore throat and  "neck pain.    Eyes: Negative for blurred vision.   Respiratory: Negative for cough, sputum production, shortness of breath and wheezing.    Cardiovascular: Negative for chest pain, palpitations,  and leg swelling.   Gastrointestinal: Negative for heartburn, nausea, vomiting, diarrhea and abdominal pain.   Genitourinary: Negative for dysuria, urgency and frequency.   Musculoskeletal: Negative for myalgias, back pain and joint pain.   Skin: Negative for rash and itching.   Neurological: Negative for dizziness, tingling, tremors, sensory change, focal weakness and headaches.   Endo/Heme/Allergies: Does not bruise/bleed easily.   Psychiatric/Behavioral: Negative for depression, anxiety, suicidal ideas, insomnia and memory loss.      Exam:  Blood pressure 132/80, pulse 106, temperature 36.7 °C (98.1 °F), resp. rate 20, height 1.626 m (5' 4.02\"), weight 61.236 kg (135 lb), SpO2 83 %.    General:  Well nourished, well developed in NAD  Head is grossly normal.  Neck: Supple without JVD   Pulmonary:  Normal effort.   Cardiovascular: Regular rate  Extremities: no clubbing, cyanosis, or edema.  Psych: affect appropriate    O2 sat declines to 83% walking <30 seconds.      Please note that this dictation was created using voice recognition software. I have made every reasonable attempt to correct obvious errors, but I expect that there are errors of grammar and possibly content that I did not discover before finalizing the note.    Assessment/Plan:  1. Chronic respiratory failure with hypoxia (CMS-HCC)  2. Pulmonary hypertension (CMS-HCC)  3. Nocturnal hypoxia  4. Chronic obstructive pulmonary disease, unspecified COPD type (CMS-HCC)  Start daytime oxygen.  Patient would benefit from a portable unit that would be easier to carry.    Orders today for 24/7 O2 at 2 LPM  3 month follow up          "

## 2017-08-09 NOTE — MR AVS SNAPSHOT
"        Nieves Bakernett   2017 3:20 PM   Office Visit   MRN: [id number]    Department:  [facility]   Dept Phone:  [phone]    Description:  Female : 1932   Provider:  Selene Carrington M.D.           Reason for Visit     Other O2 check      Allergies as of 2017     No Known Allergies      You were diagnosed with     Chronic respiratory failure with hypoxia (CMS-[facility])   [336117]       Pulmonary hypertension (CMS-HCC)   [774265]       Nocturnal hypoxia   [221405]       Chronic obstructive pulmonary disease, unspecified COPD type (CMS-[facility])   [0326571]         Vital Signs     Blood Pressure Pulse Temperature Respirations Height Weight    132/80 mmHg 106 36.7 °C (98.1 °F) 20 1.626 m (5' 4.02\") 61.236 kg (135 lb)    Body Mass Index Oxygen Saturation Smoking Status             23.16 kg/m2 83% Never Smoker          Basic Information     Date Of Birth Sex Race Ethnicity Preferred Language    1932 Female White Non- English      Your appointments     2017  7:45 AM   FOLLOW UP with Korey Forde M.D.   [facility] (--)    [address], [facility] [address]  [facility] [id number] [phone]              Problem List              ICD-10-CM Priority Class Noted - Resolved    Essential hypertension I10   2015 - Present    COPD (chronic obstructive pulmonary disease) (CMS-HCC) J44.9   2015 - Present    Hyperlipidemia E78.5   2015 - Present    Pulmonary hypertension (CMS-HCC) I27.2   2015 - Present    Osteopenia M85.80   2015 - Present    Nocturnal hypoxia G47.34   2016 - Present    Chronic respiratory failure (CMS-HCC) J96.10   2017 - Present      Health Maintenance        Date Due Completion Dates    IMM DTaP/Tdap/Td Vaccine (1 - Tdap) 1951 ---    IMM ZOSTER VACCINE 1992 ---    IMM PNEUMOCOCCAL 65+ (ADULT) LOW/MEDIUM RISK SERIES (2 of 2 - PCV13) 2016    IMM INFLUENZA (1) 2017 " 12/12/2015    BONE DENSITY 10/16/2020 10/16/2015            Current Immunizations     Influenza Vaccine Adult HD 12/12/2015    Pneumococcal polysaccharide vaccine (PPSV-23) 12/28/2015      Below and/or attached are the medications your provider expects you to take. Review all of your home medications and newly ordered medications with your provider and/or pharmacist. Follow medication instructions as directed by your provider and/or pharmacist. Please keep your medication list with you and share with your provider. Update the information when medications are discontinued, doses are changed, or new medications (including over-the-counter products) are added; and carry medication information at all times in the event of emergency situations     Allergies:  No Known Allergies          Medications  Valid as of: August 09, 2017 -  3:46 PM    Generic Name Brand Name Tablet Size Instructions for use    Albuterol Sulfate (Aero Soln) albuterol 108 (90 BASE) MCG/ACT Inhale 2 Puffs by mouth every 6 hours as needed for Shortness of Breath.        Aspirin (Tab) aspirin 81 MG Take 81 mg by mouth every day.        Cholecalciferol (Tab) Vitamin D-3 5000 UNITS Take  by mouth.        Influenza Vac Split High-Dose (Suspension Prefilled Syringe) FLUZONE HIGH-DOSE 0.5 ML TO BE ADMINISTERED BY PHARMACIST FOR IMMUNIZATION        Losartan Potassium (Tab) COZAAR 50 MG TAKE 1 TABLET DAILY        Rosuvastatin Calcium (Tab) CRESTOR 10 MG Take 1 Tab by mouth every evening.        Umeclidinium-Vilanterol (AEROSOL POWDER, BREATH ACTIVATED) Umeclidinium-Vilanterol 62.5-25 MCG/INH Take 1 Inhalation by mouth every day.        .                 Medicines prescribed today were sent to:     [facility] - [address] [address]    Phone: [phone] Fax: [phone]    Open 24 Hours?: No      Medication refill instructions:       If your prescription bottle indicates you have  medication refills left, it is not necessary to call your provider’s office. Please contact your pharmacy and they will refill your medication.    If your prescription bottle indicates you do not have any refills left, you may request refills at any time through one of the following ways: The online [employer] system (except Urgent Care), by calling your provider’s office, or by asking your pharmacy to contact your provider’s office with a refill request. Medication refills are processed only during regular business hours and may not be available until the next business day. Your provider may request additional information or to have a follow-up visit with you prior to refilling your medication.   *Please Note: Medication refills are assigned a new Rx number when refilled electronically. Your pharmacy may indicate that no refills were authorized even though a new prescription for the same medication is available at the pharmacy. Please request the medicine by name with the pharmacy before contacting your provider for a refill.           [email or web address] Status: Patient Declined

## 2017-08-22 NOTE — TELEPHONE ENCOUNTER
Was the patient seen in the last year in this department? Yes     Does patient have an active prescription for medications requested? No     Received Request Via: Pharmacy      Pt met protocol?: Yes, OV earlier this month regarding dx

## 2017-10-13 RX ORDER — LOSARTAN POTASSIUM 50 MG/1
TABLET ORAL
Qty: 90 TAB | Refills: 1 | Status: SHIPPED | OUTPATIENT
Start: 2017-10-13 | End: 2018-04-11 | Stop reason: SDUPTHER

## 2017-10-13 NOTE — TELEPHONE ENCOUNTER
Refill X 6 months, sent to pharmacy.Pt. Seen in the last 6 months per protocol.   Lab Results   Component Value Date/Time    SODIUM 137 07/20/2017 09:17 AM    POTASSIUM 4.9 07/20/2017 09:17 AM    CHLORIDE 100 07/20/2017 09:17 AM    CO2 27 07/20/2017 09:17 AM    GLUCOSE 103 (H) 07/20/2017 09:17 AM    BUN 23 (H) 07/20/2017 09:17 AM    CREATININE 0.94 07/20/2017 09:17 AM    BUNCREATRAT 24 10/12/2016 08:34 AM

## 2017-10-30 DIAGNOSIS — E78.2 MIXED HYPERLIPIDEMIA: ICD-10-CM

## 2017-10-31 RX ORDER — ROSUVASTATIN CALCIUM 10 MG/1
TABLET, COATED ORAL
Qty: 90 TAB | Refills: 2 | Status: SHIPPED | OUTPATIENT
Start: 2017-10-31 | End: 2018-07-28 | Stop reason: SDUPTHER

## 2017-11-06 ENCOUNTER — HOSPITAL ENCOUNTER (OUTPATIENT)
Dept: RADIOLOGY | Facility: MEDICAL CENTER | Age: 82
End: 2017-11-06
Attending: FAMILY MEDICINE
Payer: MEDICARE

## 2017-11-06 DIAGNOSIS — Z78.0 POSTMENOPAUSAL ESTROGEN DEFICIENCY: ICD-10-CM

## 2017-11-06 DIAGNOSIS — M85.9 DISORDER OF BONE DENSITY AND STRUCTURE, UNSPECIFIED: ICD-10-CM

## 2017-11-06 PROCEDURE — 77080 DXA BONE DENSITY AXIAL: CPT

## 2017-11-09 ENCOUNTER — TELEPHONE (OUTPATIENT)
Dept: MEDICAL GROUP | Facility: PHYSICIAN GROUP | Age: 82
End: 2017-11-09

## 2017-11-09 NOTE — TELEPHONE ENCOUNTER
----- Message from Selene Carrington M.D. sent at 11/9/2017  9:53 AM PST -----  Bone density shows some increase in the bone density in some places but some loss.  We will continue with the same treatment and recheck in 2 years.

## 2017-11-10 ENCOUNTER — OFFICE VISIT (OUTPATIENT)
Dept: MEDICAL GROUP | Facility: PHYSICIAN GROUP | Age: 82
End: 2017-11-10
Payer: MEDICARE

## 2017-11-10 VITALS
HEIGHT: 64 IN | TEMPERATURE: 97.7 F | BODY MASS INDEX: 22.98 KG/M2 | RESPIRATION RATE: 20 BRPM | OXYGEN SATURATION: 92 % | WEIGHT: 134.6 LBS | SYSTOLIC BLOOD PRESSURE: 148 MMHG | DIASTOLIC BLOOD PRESSURE: 80 MMHG | HEART RATE: 98 BPM

## 2017-11-10 DIAGNOSIS — J96.11 CHRONIC RESPIRATORY FAILURE WITH HYPOXIA (HCC): ICD-10-CM

## 2017-11-10 DIAGNOSIS — G47.34 NOCTURNAL HYPOXIA: ICD-10-CM

## 2017-11-10 DIAGNOSIS — J44.9 CHRONIC OBSTRUCTIVE PULMONARY DISEASE, UNSPECIFIED COPD TYPE (HCC): ICD-10-CM

## 2017-11-10 DIAGNOSIS — I27.20 PULMONARY HYPERTENSION (HCC): ICD-10-CM

## 2017-11-10 DIAGNOSIS — I10 ESSENTIAL HYPERTENSION: ICD-10-CM

## 2017-11-10 DIAGNOSIS — M81.0 AGE-RELATED OSTEOPOROSIS WITHOUT CURRENT PATHOLOGICAL FRACTURE: ICD-10-CM

## 2017-11-10 DIAGNOSIS — Z23 NEED FOR VACCINATION FOR STREP PNEUMONIAE: ICD-10-CM

## 2017-11-10 DIAGNOSIS — E78.2 MIXED HYPERLIPIDEMIA: ICD-10-CM

## 2017-11-10 PROCEDURE — 99214 OFFICE O/P EST MOD 30 MIN: CPT | Mod: 25 | Performed by: FAMILY MEDICINE

## 2017-11-10 PROCEDURE — G0009 ADMIN PNEUMOCOCCAL VACCINE: HCPCS | Performed by: FAMILY MEDICINE

## 2017-11-10 PROCEDURE — 90670 PCV13 VACCINE IM: CPT | Performed by: FAMILY MEDICINE

## 2017-11-10 RX ORDER — ALENDRONATE SODIUM 70 MG/1
70 TABLET ORAL
Qty: 12 TAB | Refills: 3 | Status: SHIPPED | OUTPATIENT
Start: 2017-11-10 | End: 2018-09-24 | Stop reason: SDUPTHER

## 2017-11-10 NOTE — PROGRESS NOTES
Chief Complaint   Patient presents with   • Follow-Up     bone density       HISTORY OF PRESENT ILLNESS: Patient is a 84 y.o. female established patient here today for the following concerns:    1. Age-related osteoporosis without current pathological fracture  Here today for follow up.  Bone density is showing further bone loss and increase in fracture risk.  She has not been treated with any medications, but does take Ca++ and Vit D on occasion.      2. Chronic respiratory failure with hypoxia (CMS-[facility])  3. Chronic obstructive pulmonary disease, unspecified COPD type (CMS-[facility])  4. Nocturnal hypoxia  7. Pulmonary hypertension  Nieves has hx of COPD, chronic respiratory failure with nocturnal hypoxia and need for oxygen with exertion.  She reports that she is using the concentrator at night and while sitting at home, but that often she does not bring the tanks into the stores as she is concerned about falling with them as they are quite heavy.  She would be more apt to use them if it was a smaller device.     4. Essential hypertension  Patient continues on the losartan for BP control.  Denies any CP, SOB.  No palpitations.      5. Need for vaccination for Strep pneumoniae  Due for Prevnar 13        8. Mixed hyperlipidemia  Continues on Crestor daily with good tolerability and no myalgias.  No CP.       Past Medical, Social, and Family history reviewed and updated in EPIC    Allergies:Review of patient's allergies indicates no known allergies.    Current Outpatient Prescriptions   Medication Sig Dispense Refill   • alendronate (FOSAMAX) 70 MG Tab Take 1 Tab by mouth every 7 days. 12 Tab 3   • rosuvastatin (CRESTOR) 10 MG Tab TAKE 1 TABLET EVERY EVENING 90 Tab 2   • losartan (COZAAR) 50 MG Tab TAKE 1 TABLET DAILY 90 Tab 1   • ANORO ELLIPTA 62.5-25 MCG/INH AEROSOL POWDER, BREATH ACTIVATED USE 1 INHALATION DAILY. 180 Each 0   • albuterol (VENTOLIN OR PROVENTIL) 108 (90 BASE) MCG/ACT Aero Soln inhalation aerosol Inhale 2  "Puffs by mouth every 6 hours as needed for Shortness of Breath. 3 Inhaler 3   • Cholecalciferol (VITAMIN D-3) 5000 UNITS Tab Take  by mouth.     • aspirin 81 MG tablet Take 81 mg by mouth every day.       No current facility-administered medications for this visit.          ROS:  Review of Systems   Constitutional: Negative for fever, chills, weight loss and malaise/fatigue.   HENT: Negative for ear pain, nosebleeds, congestion, sore throat and neck pain.    Eyes: Negative for blurred vision.   Respiratory: Negative for cough, sputum production, shortness of breath and wheezing.    Cardiovascular: Negative for chest pain, palpitations,  and leg swelling.   Gastrointestinal: Negative for heartburn, nausea, vomiting, diarrhea and abdominal pain.   Genitourinary: Negative for dysuria, urgency and frequency.   Musculoskeletal: Negative for myalgias, back pain and joint pain.   Skin: Negative for rash and itching.   Neurological: Negative for dizziness, tingling, tremors, sensory change, focal weakness and headaches.   Endo/Heme/Allergies: Does not bruise/bleed easily.   Psychiatric/Behavioral: Negative for depression, anxiety, suicidal ideas, insomnia and memory loss.      Exam:  Blood pressure 148/80, pulse 98, temperature 36.5 °C (97.7 °F), resp. rate 20, height 1.626 m (5' 4.02\"), weight 61.1 kg (134 lb 9.6 oz), SpO2 92 %.    General:  Well nourished, well developed in NAD  Head is grossly normal.  Neck: Supple without JVD   Pulmonary:  Normal effort.   Cardiovascular: Regular rate  Extremities: no clubbing, cyanosis, or edema.  Psych: affect appropriate      Please note that this dictation was created using voice recognition software. I have made every reasonable attempt to correct obvious errors, but I expect that there are errors of grammar and possibly content that I did not discover before finalizing the note.    Assessment/Plan:  1. Age-related osteoporosis without current pathological fracture  Start 5 years " of   - alendronate (FOSAMAX) 70 MG Tab; Take 1 Tab by mouth every 7 days.  Dispense: 12 Tab; Refill: 3    2. Chronic respiratory failure with hypoxia (CMS-HCC)  Continue O2, patient would benefit from small portable concentrator because of Osteoporosis and concerning for pulling the larger tank behind her.       3. Chronic obstructive pulmonary disease, unspecified COPD type (CMS-HCC)  Continue inhaled medication.     4. Essential hypertension  Continue losartan.     5. Need for vaccination for Strep pneumoniae  Continue   - Prevnar 13 PCV-13    6. Nocturnal hypoxia  Continue O2    7. Pulmonary hypertension  Secondary to COPD, continue inhaled medicines and oxygen.     8. Mixed hyperlipidemia  Continue Crestor    3-6 month follow up

## 2017-11-17 ENCOUNTER — OFFICE VISIT (OUTPATIENT)
Dept: CARDIOLOGY | Facility: MEDICAL CENTER | Age: 82
End: 2017-11-17
Payer: MEDICARE

## 2017-11-17 VITALS
HEART RATE: 94 BPM | HEIGHT: 64 IN | DIASTOLIC BLOOD PRESSURE: 90 MMHG | BODY MASS INDEX: 23.22 KG/M2 | SYSTOLIC BLOOD PRESSURE: 170 MMHG | WEIGHT: 136 LBS

## 2017-11-17 DIAGNOSIS — I27.20 PULMONARY HYPERTENSION (HCC): ICD-10-CM

## 2017-11-17 DIAGNOSIS — J44.9 CHRONIC OBSTRUCTIVE PULMONARY DISEASE, UNSPECIFIED COPD TYPE (HCC): ICD-10-CM

## 2017-11-17 DIAGNOSIS — E78.2 MIXED HYPERLIPIDEMIA: ICD-10-CM

## 2017-11-17 DIAGNOSIS — I10 ESSENTIAL HYPERTENSION: ICD-10-CM

## 2017-11-17 DIAGNOSIS — J96.11 CHRONIC RESPIRATORY FAILURE WITH HYPOXIA (HCC): ICD-10-CM

## 2017-11-17 PROCEDURE — 99214 OFFICE O/P EST MOD 30 MIN: CPT | Performed by: INTERNAL MEDICINE

## 2017-11-17 ASSESSMENT — ENCOUNTER SYMPTOMS
HEMOPTYSIS: 0
PALPITATIONS: 0
RESPIRATORY NEGATIVE: 1
ORTHOPNEA: 0
CONSTITUTIONAL NEGATIVE: 1
SHORTNESS OF BREATH: 0
WHEEZING: 0
CHILLS: 0
SPUTUM PRODUCTION: 0
BRUISES/BLEEDS EASILY: 0
MUSCULOSKELETAL NEGATIVE: 1
COUGH: 0
FEVER: 0
DIZZINESS: 0
WEAKNESS: 0
STRIDOR: 0
CLAUDICATION: 0
NEUROLOGICAL NEGATIVE: 1
LOSS OF CONSCIOUSNESS: 0
GASTROINTESTINAL NEGATIVE: 1
PND: 0
SORE THROAT: 0
CARDIOVASCULAR NEGATIVE: 1
EYES NEGATIVE: 1

## 2017-11-17 NOTE — LETTER
[facility]   [address]  [place] [address]  Phone: [phone]  Fax: [phone]              Nieves Busch  12/25/1932    Encounter Date: 11/17/2017    Korey Forde M.D.          PROGRESS NOTE:  Subjective:   Nieves Busch is a 84 y.o. female who presents today as a follow-up for her pulmonary hypertension likely induced by COPD. Since I've seen her last time she's had no changes in her clinical status. She thinks her blood pressure is mildly elevated today because she was in a rush to get in here. She says she checks her blood pressures at home which average about 130 systolic. She's had no lower extremity edema and she continues to be aasymptomatic.    Past Medical History:   Diagnosis Date   • Essential hypertension 9/25/2015     Past Surgical History:   Procedure Laterality Date   • CATARACT PHACO WITH IOL  8/20/2013    Performed by Wagner Gomez M.D. at [facility]   • CATARACT PHACO WITH IOL  8/6/2013    Performed by Wagner Gomez M.D. at [facility]     Family History   Problem Relation Age of Onset   • Cancer Daughter    • Cancer Father      Lungs   • Other Father      Tobbacco   • Alcohol/Drug Mother      History   Smoking Status   • Never Smoker   Smokeless Tobacco   • Never Used     Comment: significant 2nd hand smoke exposure.      No Known Allergies  Outpatient Encounter Prescriptions as of 11/17/2017   Medication Sig Dispense Refill   • alendronate (FOSAMAX) 70 MG Tab Take 1 Tab by mouth every 7 days. 12 Tab 3   • rosuvastatin (CRESTOR) 10 MG Tab TAKE 1 TABLET EVERY EVENING 90 Tab 2   • losartan (COZAAR) 50 MG Tab TAKE 1 TABLET DAILY 90 Tab 1   • ANORO ELLIPTA 62.5-25 MCG/INH AEROSOL POWDER, BREATH ACTIVATED USE 1 INHALATION DAILY. 180 Each 0   • albuterol (VENTOLIN OR PROVENTIL) 108 (90 BASE) MCG/ACT Aero Soln inhalation aerosol Inhale 2 Puffs by mouth every 6 hours as needed for Shortness  "of Breath. 3 Inhaler 3   • Cholecalciferol (VITAMIN D-3) 5000 UNITS Tab Take  by mouth.     • aspirin 81 MG tablet Take 81 mg by mouth every day.       No facility-administered encounter medications on file as of 11/17/2017.      Review of Systems   Constitutional: Negative.  Negative for chills, fever and malaise/fatigue.   HENT: Negative.  Negative for sore throat.    Eyes: Negative.    Respiratory: Negative.  Negative for cough, hemoptysis, sputum production, shortness of breath, wheezing and stridor.    Cardiovascular: Negative.  Negative for chest pain, palpitations, orthopnea, claudication, leg swelling and PND.   Gastrointestinal: Negative.    Genitourinary: Negative.    Musculoskeletal: Negative.    Skin: Negative.    Neurological: Negative.  Negative for dizziness, loss of consciousness and weakness.   Endo/Heme/Allergies: Negative.  Does not bruise/bleed easily.   All other systems reviewed and are negative.       Objective:   BP (!) 170/90   Pulse 94   Ht 1.626 m (5' 4\")   Wt 61.7 kg (136 lb)   BMI 23.34 kg/m²      Physical Exam   Constitutional: She is oriented to person, place, and time. She appears well-developed and well-nourished. No distress.   HENT:   Head: Normocephalic.   Mouth/Throat: Oropharynx is clear and moist.   Eyes: EOM are normal. Pupils are equal, round, and reactive to light. Right eye exhibits no discharge. Left eye exhibits no discharge. No scleral icterus.   Neck: Normal range of motion. Neck supple. No JVD present. No tracheal deviation present.   Cardiovascular: Normal rate, regular rhythm, S1 normal, S2 normal, normal heart sounds, intact distal pulses and normal pulses.  Exam reveals no gallop, no S3, no S4 and no friction rub.    No murmur heard.   No systolic murmur is present    No diastolic murmur is present   Pulses:       Carotid pulses are 2+ on the right side, and 2+ on the left side.       Radial pulses are 2+ on the right side, and 2+ on the left side.        " Dorsalis pedis pulses are 2+ on the right side, and 2+ on the left side.        Posterior tibial pulses are 2+ on the right side, and 2+ on the left side.   Pulmonary/Chest: Effort normal and breath sounds normal. No respiratory distress. She has no wheezes. She has no rales.   Abdominal: Soft. Bowel sounds are normal. She exhibits no distension and no mass. There is no tenderness. There is no rebound and no guarding.   Musculoskeletal: She exhibits no edema.   Neurological: She is alert and oriented to person, place, and time. No cranial nerve deficit.   Skin: Skin is warm and dry. She is not diaphoretic. No pallor.   Psychiatric: She has a normal mood and affect. Her behavior is normal. Judgment and thought content normal.   Nursing note and vitals reviewed.      Assessment:     1. Chronic respiratory failure with hypoxia (CMS-HCC)     2. Essential hypertension     3. Chronic obstructive pulmonary disease, unspecified COPD type (CMS-HCC)  ECHOCARDIOGRAM COMP W/O CONT   4. Pulmonary hypertension  ECHOCARDIOGRAM COMP W/O CONT   5. Mixed hyperlipidemia  ECHOCARDIOGRAM COMP W/O CONT       Medical Decision Making:  Today's Assessment / Status / Plan:     84-year-old female with COPD and pulmonary hypertension likely WHO type III  From her long-standing COPD.  We will go ahead and recheck her echocardiogram today to look for progression of her PA pressures. If they're elevated we'll consider starting medication for this. We'll see her back in 3 months to discuss this further.    Thank for you allowing me to take part in your patient's care, please call should you have any questions or would like to discuss this patient.      Selene Carrington M.D.  [address] [place] [id number]  VIA In Basket

## 2017-11-17 NOTE — PROGRESS NOTES
Subjective:   Nieves Busch is a 84 y.o. female who presents today as a follow-up for her pulmonary hypertension likely induced by COPD. Since I've seen her last time she's had no changes in her clinical status. She thinks her blood pressure is mildly elevated today because she was in a rush to get in here. She says she checks her blood pressures at home which average about 130 systolic. She's had no lower extremity edema and she continues to be aasymptomatic.    Past Medical History:   Diagnosis Date   • Essential hypertension 9/25/2015     Past Surgical History:   Procedure Laterality Date   • CATARACT PHACO WITH IOL  8/20/2013    Performed by Wagner Gomez M.D. at [facility]   • CATARACT PHACO WITH IOL  8/6/2013    Performed by Wagner Gomez M.D. at [facility]     Family History   Problem Relation Age of Onset   • Cancer Daughter    • Cancer Father      Lungs   • Other Father      Tobbacco   • Alcohol/Drug Mother      History   Smoking Status   • Never Smoker   Smokeless Tobacco   • Never Used     Comment: significant 2nd hand smoke exposure.      No Known Allergies  Outpatient Encounter Prescriptions as of 11/17/2017   Medication Sig Dispense Refill   • alendronate (FOSAMAX) 70 MG Tab Take 1 Tab by mouth every 7 days. 12 Tab 3   • rosuvastatin (CRESTOR) 10 MG Tab TAKE 1 TABLET EVERY EVENING 90 Tab 2   • losartan (COZAAR) 50 MG Tab TAKE 1 TABLET DAILY 90 Tab 1   • ANORO ELLIPTA 62.5-25 MCG/INH AEROSOL POWDER, BREATH ACTIVATED USE 1 INHALATION DAILY. 180 Each 0   • albuterol (VENTOLIN OR PROVENTIL) 108 (90 BASE) MCG/ACT Aero Soln inhalation aerosol Inhale 2 Puffs by mouth every 6 hours as needed for Shortness of Breath. 3 Inhaler 3   • Cholecalciferol (VITAMIN D-3) 5000 UNITS Tab Take  by mouth.     • aspirin 81 MG tablet Take 81 mg by mouth every day.       No facility-administered encounter medications on file as of 11/17/2017.      Review of Systems   Constitutional:  "Negative.  Negative for chills, fever and malaise/fatigue.   HENT: Negative.  Negative for sore throat.    Eyes: Negative.    Respiratory: Negative.  Negative for cough, hemoptysis, sputum production, shortness of breath, wheezing and stridor.    Cardiovascular: Negative.  Negative for chest pain, palpitations, orthopnea, claudication, leg swelling and PND.   Gastrointestinal: Negative.    Genitourinary: Negative.    Musculoskeletal: Negative.    Skin: Negative.    Neurological: Negative.  Negative for dizziness, loss of consciousness and weakness.   Endo/Heme/Allergies: Negative.  Does not bruise/bleed easily.   All other systems reviewed and are negative.       Objective:   BP (!) 170/90   Pulse 94   Ht 1.626 m (5' 4\")   Wt 61.7 kg (136 lb)   BMI 23.34 kg/m²     Physical Exam   Constitutional: She is oriented to person, place, and time. She appears well-developed and well-nourished. No distress.   HENT:   Head: Normocephalic.   Mouth/Throat: Oropharynx is clear and moist.   Eyes: EOM are normal. Pupils are equal, round, and reactive to light. Right eye exhibits no discharge. Left eye exhibits no discharge. No scleral icterus.   Neck: Normal range of motion. Neck supple. No JVD present. No tracheal deviation present.   Cardiovascular: Normal rate, regular rhythm, S1 normal, S2 normal, normal heart sounds, intact distal pulses and normal pulses.  Exam reveals no gallop, no S3, no S4 and no friction rub.    No murmur heard.   No systolic murmur is present    No diastolic murmur is present   Pulses:       Carotid pulses are 2+ on the right side, and 2+ on the left side.       Radial pulses are 2+ on the right side, and 2+ on the left side.        Dorsalis pedis pulses are 2+ on the right side, and 2+ on the left side.        Posterior tibial pulses are 2+ on the right side, and 2+ on the left side.   Pulmonary/Chest: Effort normal and breath sounds normal. No respiratory distress. She has no wheezes. She has no " rales.   Abdominal: Soft. Bowel sounds are normal. She exhibits no distension and no mass. There is no tenderness. There is no rebound and no guarding.   Musculoskeletal: She exhibits no edema.   Neurological: She is alert and oriented to person, place, and time. No cranial nerve deficit.   Skin: Skin is warm and dry. She is not diaphoretic. No pallor.   Psychiatric: She has a normal mood and affect. Her behavior is normal. Judgment and thought content normal.   Nursing note and vitals reviewed.      Assessment:     1. Chronic respiratory failure with hypoxia (CMS-[facility])     2. Essential hypertension     3. Chronic obstructive pulmonary disease, unspecified COPD type (CMS-[facility])  ECHOCARDIOGRAM COMP W/O CONT   4. Pulmonary hypertension  ECHOCARDIOGRAM COMP W/O CONT   5. Mixed hyperlipidemia  ECHOCARDIOGRAM COMP W/O CONT       Medical Decision Making:  Today's Assessment / Status / Plan:     84-year-old female with COPD and pulmonary hypertension likely WHO type III  From her long-standing COPD.  We will go ahead and recheck her echocardiogram today to look for progression of her PA pressures. If they're elevated we'll consider starting medication for this. We'll see her back in 3 months to discuss this further.    Thank for you allowing me to take part in your patient's care, please call should you have any questions or would like to discuss this patient.

## 2017-12-27 ENCOUNTER — HOSPITAL ENCOUNTER (OUTPATIENT)
Dept: CARDIOLOGY | Facility: MEDICAL CENTER | Age: 82
End: 2017-12-27
Attending: INTERNAL MEDICINE
Payer: MEDICARE

## 2017-12-27 DIAGNOSIS — J44.9 CHRONIC OBSTRUCTIVE PULMONARY DISEASE, UNSPECIFIED COPD TYPE (HCC): ICD-10-CM

## 2017-12-27 DIAGNOSIS — E78.2 MIXED HYPERLIPIDEMIA: ICD-10-CM

## 2017-12-27 DIAGNOSIS — I27.20 PULMONARY HYPERTENSION (HCC): ICD-10-CM

## 2017-12-27 LAB
LV EJECT FRACT  99904: 60
LV EJECT FRACT MOD 4C 99902: 63.94

## 2017-12-27 PROCEDURE — 93306 TTE W/DOPPLER COMPLETE: CPT

## 2017-12-27 PROCEDURE — 93306 TTE W/DOPPLER COMPLETE: CPT | Mod: 26 | Performed by: INTERNAL MEDICINE

## 2018-01-10 ENCOUNTER — TELEPHONE (OUTPATIENT)
Dept: CARDIOLOGY | Facility: MEDICAL CENTER | Age: 83
End: 2018-01-10

## 2018-01-10 NOTE — TELEPHONE ENCOUNTER
ECHOCARDIOGRAM COMP W/O CONT   Order: [id number]   Status:  Final result   Visible to patient:  No (Inaccessible in MyChart) Dx:  Mixed hyperlipidemia; Pulmonary hyper...   Notes Recorded by Korey Forde M.D. on 1/9/2018 at 7:18 AM PST  Echo is looking better!, PLease let them know  ------    Notes Recorded by Graciela Mcfadden R.N. on 12/27/2017 at 4:23 PM PST  On recall to see you 5/2018     Letter sent.

## 2018-04-11 NOTE — TELEPHONE ENCOUNTER
Was the patient seen in the last year in this department? Yes     Does patient have an active prescription for medications requested? No     Received Request Via: Pharmacy      Pt met protocol?: Yes, ov pcp 11/10/17 ov heart  11/17/17 bp 170/90 appt with heart  5/15/18

## 2018-04-12 RX ORDER — LOSARTAN POTASSIUM 50 MG/1
50 TABLET ORAL DAILY
Qty: 90 TAB | Refills: 0 | Status: SHIPPED | OUTPATIENT
Start: 2018-04-12 | End: 2018-07-11 | Stop reason: SDUPTHER

## 2018-04-12 NOTE — TELEPHONE ENCOUNTER
Refill X 3months, sent to pharmacy.Pt. Seen in the last 6 months per protocol.  Due for follow up appt. Please schedule.  Lab Results   Component Value Date/Time    SODIUM 137 07/20/2017 09:17 AM    POTASSIUM 4.9 07/20/2017 09:17 AM    CHLORIDE 100 07/20/2017 09:17 AM    CO2 27 07/20/2017 09:17 AM    GLUCOSE 103 (H) 07/20/2017 09:17 AM    BUN 23 (H) 07/20/2017 09:17 AM    CREATININE 0.94 07/20/2017 09:17 AM    BUNCREATRAT 24 10/12/2016 08:34 AM

## 2018-05-08 ENCOUNTER — OFFICE VISIT (OUTPATIENT)
Dept: MEDICAL GROUP | Facility: PHYSICIAN GROUP | Age: 83
End: 2018-05-08
Payer: MEDICARE

## 2018-05-08 VITALS
BODY MASS INDEX: 22.26 KG/M2 | TEMPERATURE: 98.8 F | OXYGEN SATURATION: 90 % | HEART RATE: 98 BPM | WEIGHT: 130.4 LBS | HEIGHT: 64 IN | DIASTOLIC BLOOD PRESSURE: 78 MMHG | RESPIRATION RATE: 22 BRPM | SYSTOLIC BLOOD PRESSURE: 140 MMHG

## 2018-05-08 DIAGNOSIS — C44.309 SKIN CANCER OF FOREHEAD: ICD-10-CM

## 2018-05-08 DIAGNOSIS — J96.11 CHRONIC RESPIRATORY FAILURE WITH HYPOXIA (HCC): ICD-10-CM

## 2018-05-08 DIAGNOSIS — J44.9 CHRONIC OBSTRUCTIVE PULMONARY DISEASE, UNSPECIFIED COPD TYPE (HCC): ICD-10-CM

## 2018-05-08 DIAGNOSIS — Z85.828 HISTORY OF SKIN CANCER: ICD-10-CM

## 2018-05-08 DIAGNOSIS — I27.20 PULMONARY HYPERTENSION (HCC): ICD-10-CM

## 2018-05-08 PROCEDURE — 99214 OFFICE O/P EST MOD 30 MIN: CPT | Performed by: FAMILY MEDICINE

## 2018-05-08 NOTE — PROGRESS NOTES
Chief Complaint   Patient presents with   • Wound Infection     on rt side of forehead       HISTORY OF PRESENT ILLNESS: Patient is a 85 y.o. female established patient here today for the following concerns:    1. Skin cancer of forehead  2. History of skin cancer  Nievse comes in today with skin lesion for the last many months if not more than 1 year that is now bleeding and itching.  SHe does have hx of a basal cell in similar location on the other side of the forehead.      3. Chronic respiratory failure with hypoxia (HCC)  4. Chronic obstructive pulmonary disease, unspecified COPD type (HCC)  5. Pulmonary hypertension (HCC)  Continues on inhaled medications, oxygen at night and at home while resting/walking.  Using tanks.  Trying to determine if it is financial worth while to purchase portable concentrator.  No recent illnesses.       Past Medical, Social, and Family history reviewed and updated in EPIC    Allergies:Patient has no known allergies.    Current Outpatient Prescriptions   Medication Sig Dispense Refill   • losartan (COZAAR) 50 MG Tab Take 1 Tab by mouth every day. 90 Tab 0   • ANORO ELLIPTA 62.5-25 MCG/INH AEROSOL POWDER, BREATH ACTIVATED inhaler USE 1 INHALATION DAILY. 180 Each 0   • alendronate (FOSAMAX) 70 MG Tab Take 1 Tab by mouth every 7 days. 12 Tab 3   • rosuvastatin (CRESTOR) 10 MG Tab TAKE 1 TABLET EVERY EVENING 90 Tab 2   • albuterol (VENTOLIN OR PROVENTIL) 108 (90 BASE) MCG/ACT Aero Soln inhalation aerosol Inhale 2 Puffs by mouth every 6 hours as needed for Shortness of Breath. 3 Inhaler 3   • Cholecalciferol (VITAMIN D-3) 5000 UNITS Tab Take  by mouth.     • aspirin 81 MG tablet Take 81 mg by mouth every day.       No current facility-administered medications for this visit.          ROS:  Review of Systems   Constitutional: Negative for fever, chills, weight loss and malaise/fatigue.   HENT: Negative for ear pain, nosebleeds, congestion, sore throat and neck pain.    Eyes: Negative for  "blurred vision.   Respiratory: Negative for cough, sputum production, shortness of breath and wheezing.    Cardiovascular: Negative for chest pain, palpitations,  and leg swelling.   Gastrointestinal: Negative for heartburn, nausea, vomiting, diarrhea and abdominal pain.   Genitourinary: Negative for dysuria, urgency and frequency.   Musculoskeletal: Negative for myalgias, back pain and joint pain.   Skin: Negative for rash and itching.   Neurological: Negative for dizziness, tingling, tremors, sensory change, focal weakness and headaches.   Endo/Heme/Allergies: Does not bruise/bleed easily.   Psychiatric/Behavioral: Negative for depression, anxiety, suicidal ideas, insomnia and memory loss.      Exam:  Blood pressure 140/78, pulse 98, temperature 37.1 °C (98.8 °F), resp. rate (!) 22, height 1.626 m (5' 4\"), weight 59.1 kg (130 lb 6.4 oz), SpO2 90 %.    General:  Well nourished, well developed in NAD  Head is grossly normal.  Neck: Supple without JVD   Pulmonary:  Normal effort.   Cardiovascular: Regular rate  Extremities: no clubbing, cyanosis, or edema.  Psych: affect appropriate  Skin: pink pearly lesion with rolled edges, telangiectasia and central necrosis consistent with basal cell    Please note that this dictation was created using voice recognition software. I have made every reasonable attempt to correct obvious errors, but I expect that there are errors of grammar and possibly content that I did not discover before finalizing the note.    Assessment/Plan:  1. Skin cancer of forehead  - REFERRAL TO DERMATOLOGY    2. History of skin cancer  - REFERRAL TO DERMATOLOGY    3. Chronic respiratory failure with hypoxia (HCC)  4. Chronic obstructive pulmonary disease, unspecified COPD type (HCC)  5. Pulmonary hypertension (HCC)  Continue O2, Continue inhaled medications, annual flu vaccine.      Follow up in 3 months.         "

## 2018-05-14 ENCOUNTER — OFFICE VISIT (OUTPATIENT)
Dept: DERMATOLOGY | Facility: IMAGING CENTER | Age: 83
End: 2018-05-14
Payer: MEDICARE

## 2018-05-14 ENCOUNTER — HOSPITAL ENCOUNTER (OUTPATIENT)
Facility: MEDICAL CENTER | Age: 83
End: 2018-05-14
Attending: DERMATOLOGY
Payer: MEDICARE

## 2018-05-14 VITALS — TEMPERATURE: 97.8 F | BODY MASS INDEX: 22.2 KG/M2 | HEIGHT: 64 IN | WEIGHT: 130 LBS

## 2018-05-14 DIAGNOSIS — D48.5 NEOPLASM OF UNCERTAIN BEHAVIOR OF SKIN: ICD-10-CM

## 2018-05-14 PROCEDURE — 88305 TISSUE EXAM BY PATHOLOGIST: CPT

## 2018-05-14 PROCEDURE — 11100 PR BIOPSY OF SKIN LESION: CPT | Performed by: DERMATOLOGY

## 2018-05-14 ASSESSMENT — ENCOUNTER SYMPTOMS
CHILLS: 0
FEVER: 0

## 2018-05-14 NOTE — PROGRESS NOTES
Dermatology New Patient Visit    Chief Complaint   Patient presents with   • Establish Care       Subjective:     HPI:   Nieves Busch is a 85 y.o. female presenting for    HPI: non healing sore   Location: forehead   Time present: 4 months   Painful lesion: No  Itching lesion: No  Enlarging lesion: Yes  Anything make it better or worse? No     History of skin cancer: Yes, Details:  Basal 20 years ago , left forehead  History of biopsies:Yes, Details: above  History of blistering/severe sunburns:Yes, Details:  During youth  Family history of skin cancer:Yes, Details: unknown  Family history of atypical moles:No          Past Medical History:   Diagnosis Date   • Essential hypertension 9/25/2015       Current Outpatient Prescriptions on File Prior to Visit   Medication Sig Dispense Refill   • losartan (COZAAR) 50 MG Tab Take 1 Tab by mouth every day. 90 Tab 0   • ANORO ELLIPTA 62.5-25 MCG/INH AEROSOL POWDER, BREATH ACTIVATED inhaler USE 1 INHALATION DAILY. 180 Each 0   • alendronate (FOSAMAX) 70 MG Tab Take 1 Tab by mouth every 7 days. 12 Tab 3   • rosuvastatin (CRESTOR) 10 MG Tab TAKE 1 TABLET EVERY EVENING 90 Tab 2   • albuterol (VENTOLIN OR PROVENTIL) 108 (90 BASE) MCG/ACT Aero Soln inhalation aerosol Inhale 2 Puffs by mouth every 6 hours as needed for Shortness of Breath. 3 Inhaler 3   • Cholecalciferol (VITAMIN D-3) 5000 UNITS Tab Take  by mouth.     • aspirin 81 MG tablet Take 81 mg by mouth every day.       No current facility-administered medications on file prior to visit.        No Known Allergies    Family History   Problem Relation Age of Onset   • Cancer Daughter    • Cancer Father      Lungs   • Other Father      Tobbacco   • Alcohol/Drug Mother        Social History     Social History   • Marital status:      Spouse name: N/A   • Number of children: N/A   • Years of education: N/A     Occupational History   • Not on file.     Social History Main Topics   • Smoking status: Never Smoker  "  • Smokeless tobacco: Never Used      Comment: significant 2nd hand smoke exposure.    • Alcohol use No   • Drug use: No   • Sexual activity: Not Currently     Partners: Male      Comment:       Other Topics Concern   • Not on file     Social History Narrative   • No narrative on file       Review of Systems   Constitutional: Negative for chills and fever.   Skin: Negative for itching and rash.   All other systems reviewed and are negative.       Objective:     A focused cutaneous exam was completed including: face, eyelids, conjunctiva, lips with the following pertinent findings listed below. Remaining above-listed examined areas within normal limits / negative for rashes or lesions.    Temperature 36.6 °C (97.8 °F), height 1.626 m (5' 4\"), weight 59 kg (130 lb).    Physical Exam   Constitutional: She is oriented to person, place, and time and well-developed, well-nourished, and in no distress.   HENT:   Head:       Nose: Nose normal.   Eyes: Conjunctivae are normal.   Neurological: She is alert and oriented to person, place, and time.   Psychiatric: Affect normal.   Vitals reviewed.      DATA: none applicable to review    Assessment and Plan:     1. Neoplasm of uncertain behavior of skin  Procedure Note   Procedure: Biopsy by shave technique  Location: as noted above  Size: as noted in exam  Preoperative diagnosis:BCc vs other  Risks, benefits and alternatives of procedure discussed and written informed consent obtained. Time out completed. Area of biopsy prepped with alcohol. Anesthesia with 1% lidocaine with epinephrine administered with 30 gauge needle. Shave biopsy of the site performed. Hemostasis achieved with pressure and aluminum chloride. Vaseline applied to wound with bandage. Patient tolerated procedure well and there were no complications. The specimen was sent to the pathology lab by the staff. Wound care was discussed.  - PATHOLOGY SPECIMEN; Future      Followup: Return for JAYDE, 3-4 " months.    Destiny Wade M.D.

## 2018-05-15 ENCOUNTER — OFFICE VISIT (OUTPATIENT)
Dept: CARDIOLOGY | Facility: MEDICAL CENTER | Age: 83
End: 2018-05-15
Payer: MEDICARE

## 2018-05-15 VITALS
HEIGHT: 64 IN | SYSTOLIC BLOOD PRESSURE: 178 MMHG | OXYGEN SATURATION: 94 % | HEART RATE: 90 BPM | DIASTOLIC BLOOD PRESSURE: 78 MMHG | WEIGHT: 131.4 LBS | BODY MASS INDEX: 22.43 KG/M2

## 2018-05-15 DIAGNOSIS — I10 ESSENTIAL HYPERTENSION: ICD-10-CM

## 2018-05-15 DIAGNOSIS — J44.9 CHRONIC OBSTRUCTIVE PULMONARY DISEASE, UNSPECIFIED COPD TYPE (HCC): ICD-10-CM

## 2018-05-15 DIAGNOSIS — G47.34 NOCTURNAL HYPOXIA: ICD-10-CM

## 2018-05-15 DIAGNOSIS — E78.2 MIXED HYPERLIPIDEMIA: ICD-10-CM

## 2018-05-15 DIAGNOSIS — M81.0 AGE-RELATED OSTEOPOROSIS WITHOUT CURRENT PATHOLOGICAL FRACTURE: ICD-10-CM

## 2018-05-15 DIAGNOSIS — J96.11 CHRONIC RESPIRATORY FAILURE WITH HYPOXIA (HCC): ICD-10-CM

## 2018-05-15 DIAGNOSIS — I27.20 PULMONARY HYPERTENSION (HCC): ICD-10-CM

## 2018-05-15 PROCEDURE — 99214 OFFICE O/P EST MOD 30 MIN: CPT | Performed by: INTERNAL MEDICINE

## 2018-05-15 ASSESSMENT — ENCOUNTER SYMPTOMS
COUGH: 0
RESPIRATORY NEGATIVE: 1
STRIDOR: 0
NEUROLOGICAL NEGATIVE: 1
DIZZINESS: 0
SORE THROAT: 0
CARDIOVASCULAR NEGATIVE: 1
CHILLS: 0
WHEEZING: 0
HEMOPTYSIS: 0
WEAKNESS: 0
ORTHOPNEA: 0
EYES NEGATIVE: 1
GASTROINTESTINAL NEGATIVE: 1
CONSTITUTIONAL NEGATIVE: 1
FEVER: 0
CLAUDICATION: 0
PALPITATIONS: 0
SPUTUM PRODUCTION: 0
SHORTNESS OF BREATH: 0
BRUISES/BLEEDS EASILY: 0
PND: 0
LOSS OF CONSCIOUSNESS: 0
MUSCULOSKELETAL NEGATIVE: 1

## 2018-05-15 NOTE — PROGRESS NOTES
Chief Complaint   Patient presents with   • Hypertension       Subjective:   Nieves Busch is a 85 y.o. female who presents today as a follow-up for her pulmonary hypertension. Since we last saw her she continues on the oxygen at night and walk home. She didn't bring it with her today. She rushed into traffic and that's why her blood pressures is elevated today. At home she checks and is about 1:30 to 140. She's not been having any changes in her exertional functional status. She does not have any lower extremity edema.    Past Medical History:   Diagnosis Date   • Essential hypertension 9/25/2015     Past Surgical History:   Procedure Laterality Date   • CATARACT PHACO WITH IOL  8/20/2013    Performed by Wagner Gomez M.D. at [facility]   • CATARACT PHACO WITH IOL  8/6/2013    Performed by Wagner Gomez M.D. at [facility]     Family History   Problem Relation Age of Onset   • Cancer Daughter    • Cancer Father      Lungs   • Other Father      Tobbacco   • Alcohol/Drug Mother      Social History     Social History   • Marital status:      Spouse name: N/A   • Number of children: N/A   • Years of education: N/A     Occupational History   • Not on file.     Social History Main Topics   • Smoking status: Never Smoker   • Smokeless tobacco: Never Used      Comment: significant 2nd hand smoke exposure.    • Alcohol use No   • Drug use: No   • Sexual activity: Not Currently     Partners: Male      Comment:       Other Topics Concern   • Not on file     Social History Narrative   • No narrative on file     No Known Allergies  Outpatient Encounter Prescriptions as of 5/15/2018   Medication Sig Dispense Refill   • losartan (COZAAR) 50 MG Tab Take 1 Tab by mouth every day. 90 Tab 0   • ANORO ELLIPTA 62.5-25 MCG/INH AEROSOL POWDER, BREATH ACTIVATED inhaler USE 1 INHALATION DAILY. 180 Each 0   • alendronate (FOSAMAX) 70 MG Tab Take 1 Tab by mouth every 7 days. 12 Tab 3  "  • rosuvastatin (CRESTOR) 10 MG Tab TAKE 1 TABLET EVERY EVENING 90 Tab 2   • albuterol (VENTOLIN OR PROVENTIL) 108 (90 BASE) MCG/ACT Aero Soln inhalation aerosol Inhale 2 Puffs by mouth every 6 hours as needed for Shortness of Breath. 3 Inhaler 3   • Cholecalciferol (VITAMIN D-3) 5000 UNITS Tab Take  by mouth.     • aspirin 81 MG tablet Take 81 mg by mouth every day.       No facility-administered encounter medications on file as of 5/15/2018.      Review of Systems   Constitutional: Negative.  Negative for chills, fever and malaise/fatigue.   HENT: Negative.  Negative for sore throat.    Eyes: Negative.    Respiratory: Negative.  Negative for cough, hemoptysis, sputum production, shortness of breath, wheezing and stridor.    Cardiovascular: Negative.  Negative for chest pain, palpitations, orthopnea, claudication, leg swelling and PND.   Gastrointestinal: Negative.    Genitourinary: Negative.    Musculoskeletal: Negative.    Skin: Negative.    Neurological: Negative.  Negative for dizziness, loss of consciousness and weakness.   Endo/Heme/Allergies: Negative.  Does not bruise/bleed easily.   All other systems reviewed and are negative.       Objective:   BP (!) 178/78   Pulse 90   Ht 1.626 m (5' 4\")   Wt 59.6 kg (131 lb 6.4 oz)   SpO2 94%   BMI 22.55 kg/m²     Physical Exam   Constitutional: She appears well-developed and well-nourished. No distress.   HENT:   Head: Normocephalic and atraumatic.   Right Ear: External ear normal.   Left Ear: External ear normal.   Nose: Nose normal.   Mouth/Throat: No oropharyngeal exudate.   Eyes: Conjunctivae and EOM are normal. Pupils are equal, round, and reactive to light. Right eye exhibits no discharge. Left eye exhibits no discharge. No scleral icterus.   Neck: Neck supple. No JVD present.   Cardiovascular: Normal rate, regular rhythm and intact distal pulses.  Exam reveals no gallop and no friction rub.    No murmur heard.  Pulmonary/Chest: Effort normal. No stridor. " No respiratory distress. She has no wheezes. She has no rales. She exhibits no tenderness.   Abdominal: Soft. She exhibits no distension. There is no guarding.   Musculoskeletal: Normal range of motion. She exhibits no edema, tenderness or deformity.   Neurological: She is alert. She has normal reflexes. She displays normal reflexes. No cranial nerve deficit. She exhibits normal muscle tone. Coordination normal.   Skin: Skin is warm and dry. No rash noted. She is not diaphoretic. No erythema. No pallor.   Psychiatric: She has a normal mood and affect. Her behavior is normal. Judgment and thought content normal.   Nursing note and vitals reviewed.    Echo: 12/27/17  CONCLUSIONS  Compared to the images of the study done 10/16/15 - there has been a   reduction of the RV systolic pressure from 65 to 40 mm Hg.    1. Left ventricular ejection fraction is visually estimated to be 60%.    2. No significant valvular heart disease.    3. Right ventricular systolic pressure is estimated to be 40 mmHg.    Lab Results   Component Value Date/Time    CHOLSTRLTOT 187 07/20/2017 09:17 AM    LDL 76 07/20/2017 09:17 AM    HDL 95 07/20/2017 09:17 AM    TRIGLYCERIDE 78 07/20/2017 09:17 AM       Lab Results   Component Value Date/Time    SODIUM 137 07/20/2017 09:17 AM    POTASSIUM 4.9 07/20/2017 09:17 AM    CHLORIDE 100 07/20/2017 09:17 AM    CO2 27 07/20/2017 09:17 AM    GLUCOSE 103 (H) 07/20/2017 09:17 AM    BUN 23 (H) 07/20/2017 09:17 AM    CREATININE 0.94 07/20/2017 09:17 AM    BUNCREATRAT 24 10/12/2016 08:34 AM     Lab Results   Component Value Date/Time    ALKPHOSPHAT 98 07/20/2017 09:17 AM    ASTSGOT 22 07/20/2017 09:17 AM    ALTSGPT 21 07/20/2017 09:17 AM    TBILIRUBIN 0.7 07/20/2017 09:17 AM        Assessment:     1. Pulmonary hypertension (HCC)     2. Nocturnal hypoxia     3. Essential hypertension     4. Mixed hyperlipidemia     5. Chronic obstructive pulmonary disease, unspecified COPD type (HCC)     6. Age-related  osteoporosis without current pathological fracture     7. Chronic respiratory failure with hypoxia (HCC)         Medical Decision Making:  Today's Assessment / Status / Plan:     85-year-old female with COPD and pulmonary hypertension. No changes to her medical therapy today. We discussed the need for her to stay on oxygen to keep her pulmonary pressures as low as possible. We will see her back in 6 months.    Thank for you allowing me to take part in your patient's care, please call should you have any questions or would like to discuss this patient.

## 2018-05-15 NOTE — LETTER
[facility]   [address]  [place] [address]  Phone: [phone]  Fax: [phone]              Nieves Busch  12/25/1932    Encounter Date: 5/15/2018    Korey Forde M.D.          PROGRESS NOTE:  Chief Complaint   Patient presents with   • Hypertension       Subjective:   Nieves Busch is a 85 y.o. female who presents today as a follow-up for her pulmonary hypertension. Since we last saw her she continues on the oxygen at night and walk home. She didn't bring it with her today. She rushed into traffic and that's why her blood pressures is elevated today. At home she checks and is about 1:30 to 140. She's not been having any changes in her exertional functional status. She does not have any lower extremity edema.    Past Medical History:   Diagnosis Date   • Essential hypertension 9/25/2015     Past Surgical History:   Procedure Laterality Date   • CATARACT PHACO WITH IOL  8/20/2013    Performed by Wagner Gomez M.D. at [facility]   • CATARACT PHACO WITH IOL  8/6/2013    Performed by Wagner Gomez M.D. at [facility]     Family History   Problem Relation Age of Onset   • Cancer Daughter    • Cancer Father      Lungs   • Other Father      Tobbacco   • Alcohol/Drug Mother      Social History     Social History   • Marital status:      Spouse name: N/A   • Number of children: N/A   • Years of education: N/A     Occupational History   • Not on file.     Social History Main Topics   • Smoking status: Never Smoker   • Smokeless tobacco: Never Used      Comment: significant 2nd hand smoke exposure.    • Alcohol use No   • Drug use: No   • Sexual activity: Not Currently     Partners: Male      Comment:       Other Topics Concern   • Not on file     Social History Narrative   • No narrative on file     No Known Allergies  Outpatient Encounter Prescriptions as of 5/15/2018   Medication Sig  "Dispense Refill   • losartan (COZAAR) 50 MG Tab Take 1 Tab by mouth every day. 90 Tab 0   • ANORO ELLIPTA 62.5-25 MCG/INH AEROSOL POWDER, BREATH ACTIVATED inhaler USE 1 INHALATION DAILY. 180 Each 0   • alendronate (FOSAMAX) 70 MG Tab Take 1 Tab by mouth every 7 days. 12 Tab 3   • rosuvastatin (CRESTOR) 10 MG Tab TAKE 1 TABLET EVERY EVENING 90 Tab 2   • albuterol (VENTOLIN OR PROVENTIL) 108 (90 BASE) MCG/ACT Aero Soln inhalation aerosol Inhale 2 Puffs by mouth every 6 hours as needed for Shortness of Breath. 3 Inhaler 3   • Cholecalciferol (VITAMIN D-3) 5000 UNITS Tab Take  by mouth.     • aspirin 81 MG tablet Take 81 mg by mouth every day.       No facility-administered encounter medications on file as of 5/15/2018.      Review of Systems   Constitutional: Negative.  Negative for chills, fever and malaise/fatigue.   HENT: Negative.  Negative for sore throat.    Eyes: Negative.    Respiratory: Negative.  Negative for cough, hemoptysis, sputum production, shortness of breath, wheezing and stridor.    Cardiovascular: Negative.  Negative for chest pain, palpitations, orthopnea, claudication, leg swelling and PND.   Gastrointestinal: Negative.    Genitourinary: Negative.    Musculoskeletal: Negative.    Skin: Negative.    Neurological: Negative.  Negative for dizziness, loss of consciousness and weakness.   Endo/Heme/Allergies: Negative.  Does not bruise/bleed easily.   All other systems reviewed and are negative.       Objective:   BP (!) 178/78   Pulse 90   Ht 1.626 m (5' 4\")   Wt 59.6 kg (131 lb 6.4 oz)   SpO2 94%   BMI 22.55 kg/m²      Physical Exam   Constitutional: She appears well-developed and well-nourished. No distress.   HENT:   Head: Normocephalic and atraumatic.   Right Ear: External ear normal.   Left Ear: External ear normal.   Nose: Nose normal.   Mouth/Throat: No oropharyngeal exudate.   Eyes: Conjunctivae and EOM are normal. Pupils are equal, round, and reactive to light. Right eye exhibits no " discharge. Left eye exhibits no discharge. No scleral icterus.   Neck: Neck supple. No JVD present.   Cardiovascular: Normal rate, regular rhythm and intact distal pulses.  Exam reveals no gallop and no friction rub.    No murmur heard.  Pulmonary/Chest: Effort normal. No stridor. No respiratory distress. She has no wheezes. She has no rales. She exhibits no tenderness.   Abdominal: Soft. She exhibits no distension. There is no guarding.   Musculoskeletal: Normal range of motion. She exhibits no edema, tenderness or deformity.   Neurological: She is alert. She has normal reflexes. She displays normal reflexes. No cranial nerve deficit. She exhibits normal muscle tone. Coordination normal.   Skin: Skin is warm and dry. No rash noted. She is not diaphoretic. No erythema. No pallor.   Psychiatric: She has a normal mood and affect. Her behavior is normal. Judgment and thought content normal.   Nursing note and vitals reviewed.    Echo: 12/27/17  CONCLUSIONS  Compared to the images of the study done 10/16/15 - there has been a   reduction of the RV systolic pressure from 65 to 40 mm Hg.    1. Left ventricular ejection fraction is visually estimated to be 60%.    2. No significant valvular heart disease.    3. Right ventricular systolic pressure is estimated to be 40 mmHg.    Lab Results   Component Value Date/Time    CHOLSTRLTOT 187 07/20/2017 09:17 AM    LDL 76 07/20/2017 09:17 AM    HDL 95 07/20/2017 09:17 AM    TRIGLYCERIDE 78 07/20/2017 09:17 AM       Lab Results   Component Value Date/Time    SODIUM 137 07/20/2017 09:17 AM    POTASSIUM 4.9 07/20/2017 09:17 AM    CHLORIDE 100 07/20/2017 09:17 AM    CO2 27 07/20/2017 09:17 AM    GLUCOSE 103 (H) 07/20/2017 09:17 AM    BUN 23 (H) 07/20/2017 09:17 AM    CREATININE 0.94 07/20/2017 09:17 AM    BUNCREATRAT 24 10/12/2016 08:34 AM     Lab Results   Component Value Date/Time    ALKPHOSPHAT 98 07/20/2017 09:17 AM    ASTSGOT 22 07/20/2017 09:17 AM    ALTSGPT 21 07/20/2017 09:17  AM    TBILIRUBIN 0.7 07/20/2017 09:17 AM        Assessment:     1. Pulmonary hypertension (HCC)     2. Nocturnal hypoxia     3. Essential hypertension     4. Mixed hyperlipidemia     5. Chronic obstructive pulmonary disease, unspecified COPD type (HCC)     6. Age-related osteoporosis without current pathological fracture     7. Chronic respiratory failure with hypoxia (HCC)         Medical Decision Making:  Today's Assessment / Status / Plan:     85-year-old female with COPD and pulmonary hypertension. No changes to her medical therapy today. We discussed the need for her to stay on oxygen to keep her pulmonary pressures as low as possible. We will see her back in 6 months.    Thank for you allowing me to take part in your patient's care, please call should you have any questions or would like to discuss this patient.      Selene Carrington M.D.  [address] [place] [id number]  VIA In Basket

## 2018-05-21 NOTE — TELEPHONE ENCOUNTER
Was the patient seen in the last year in this department? Yes     Does patient have an active prescription for medications requested? No     Received Request Via: Pharmacy    Pt met protocol?: Yes     Last OV 05/2018

## 2018-05-30 ENCOUNTER — HOSPITAL ENCOUNTER (OUTPATIENT)
Facility: MEDICAL CENTER | Age: 83
End: 2018-05-30
Attending: DERMATOLOGY
Payer: MEDICARE

## 2018-05-30 ENCOUNTER — OFFICE VISIT (OUTPATIENT)
Dept: DERMATOLOGY | Facility: IMAGING CENTER | Age: 83
End: 2018-05-30
Payer: MEDICARE

## 2018-05-30 VITALS
HEIGHT: 64 IN | WEIGHT: 132 LBS | SYSTOLIC BLOOD PRESSURE: 166 MMHG | BODY MASS INDEX: 22.53 KG/M2 | TEMPERATURE: 98.5 F | DIASTOLIC BLOOD PRESSURE: 70 MMHG

## 2018-05-30 DIAGNOSIS — C44.319 BASAL CELL CARCINOMA (BCC) OF SKIN OF OTHER PART OF FACE: ICD-10-CM

## 2018-05-30 PROCEDURE — 11642 EXC F/E/E/N/L MAL+MRG 1.1-2: CPT | Performed by: DERMATOLOGY

## 2018-05-30 PROCEDURE — 88305 TISSUE EXAM BY PATHOLOGIST: CPT

## 2018-05-30 PROCEDURE — 12052 INTMD RPR FACE/MM 2.6-5.0 CM: CPT | Performed by: DERMATOLOGY

## 2018-05-30 NOTE — PROGRESS NOTES
"PROCEDURE NOTE:    MALIGNANT LESION - WIDE LOCAL EXCISION    After patient received diagnosis of basal cell carcinoma, nodular type, further management was discussed, including Mohs vs wide local excision vs curettage & electrodesiccation (C&ED) vs topical creams vs cryotherapy. Patient opted for wide local excision. Risks, benefits and alternatives of procedure, including, but not limited to scar, bleeding, pain, infection, nerve damage, recurrence of tumor, failed surgery, and need for further surgery, were discussed and written informed consent obtained. Correct site was verified by patient and myself, and verbal time out completed.     Allergies reviewed: Yes  Pacemaker/defibrillator: No  Artificial joints: No  Antibiotics given: No    Pre-op diagnosis:  (requisition number: [id number])  Post-op diagnosis: Same  Site:right superior forehead  Pre-op size: 10x8mm    Blood pressure (!) 166/70, temperature 36.9 °C (98.5 °F), height 1.626 m (5' 4\"), weight 59.9 kg (132 lb).    Procedure: Area of surgery was prepped with alcohol, marked with 3mm margins, and with sterile marking pen. Anesthesia with 1% lidocaine with epinephrine administered with 30 gauge needle. The area was again cleaned with povidine-iodine swab. With sterile technique, a 15 blade scalpel was used to make an elliptical incision around the tumor to the level of the subcutaneous fat. The tumor was removed. Bleeding was minimal, and hemostasis was achieved with pressure, hyfrecation. Specimen was placed into biopsy container and sent to pathology by staff.    Closure:  Buried vertical mattress sutures were placed x 8 with 4.0 monocryl to close dead space. 10 superficial interrupted sutures were placed with 5.0 prolene to approximate wound edge.  Vaseline applied to wound with bandage. Patient tolerated procedure well and there were no complications, blood loss was minimal.     Final wound size: 42mm    Bandage was placed with vaseline, telfa, gauze " and tape. Wound care was discussed with the patient, and written instructions were provided. Patient to return to clinic in 5-7 days for suture removal. Patient to call us if any problems or concerns with the procedure site arise prior to scheduled appointment.     Additional follow-up will be planned for 3 months for total skin exam    Destiny Wade M.D.

## 2018-05-31 ENCOUNTER — TELEPHONE (OUTPATIENT)
Dept: DERMATOLOGY | Facility: IMAGING CENTER | Age: 83
End: 2018-05-31

## 2018-05-31 NOTE — TELEPHONE ENCOUNTER
Patient feeling well today after surgery. Took 1 tylenol, today has no pain, minimal swelling. Icing as instructed. Told her to call office with any questions or concerns.

## 2018-06-04 ENCOUNTER — OFFICE VISIT (OUTPATIENT)
Dept: DERMATOLOGY | Facility: IMAGING CENTER | Age: 83
End: 2018-06-04
Payer: MEDICARE

## 2018-06-04 DIAGNOSIS — C44.319 BASAL CELL CARCINOMA (BCC) OF SKIN OF OTHER PART OF FACE: ICD-10-CM

## 2018-06-04 NOTE — PROGRESS NOTES
Patient seen by Dayanara MOYER) for suture removal today. No problems with surgical site. Further f/u has been discussed.    Destiny Wade

## 2018-06-27 ENCOUNTER — TELEPHONE (OUTPATIENT)
Dept: DERMATOLOGY | Facility: IMAGING CENTER | Age: 83
End: 2018-06-27

## 2018-06-27 NOTE — TELEPHONE ENCOUNTER
Patient will be coming in on Monday 07/02/2018 to have exesion site looked at . Post op 05/30/18 , has noticed a little bump and some irritation at the end of excision  . No pain , no drainage and no swelling per patient .  Advised for patient to come in some time next week to have it evaluated . Appointment scheduled and patient informed .

## 2018-07-02 ENCOUNTER — OFFICE VISIT (OUTPATIENT)
Dept: DERMATOLOGY | Facility: IMAGING CENTER | Age: 83
End: 2018-07-02
Payer: MEDICARE

## 2018-07-02 DIAGNOSIS — Z51.89 VISIT FOR WOUND CHECK: ICD-10-CM

## 2018-07-02 NOTE — PROGRESS NOTES
S: s/p excision BCC 5/30/18 right superior forehead, margins negative, here 2/2 concern for new lesion over lateral incision site 1 week ago. Seems to be improving today    O: Right superior forehead with well-healed excision site, lateral aspect with superficial erosion, 2mm petechiae    A/P: Superficial excoriation, possibly resolved spitting suture  - Benign-appearing nature of lesions discussed. Advised to return to clinic for any new or concerning changes.  - will f/u, monitor at 9/10 appointment    RTC PRN, Sept for JAYDE Wade

## 2018-07-11 ENCOUNTER — TELEPHONE (OUTPATIENT)
Dept: MEDICAL GROUP | Facility: PHYSICIAN GROUP | Age: 83
End: 2018-07-11

## 2018-07-11 DIAGNOSIS — I10 ESSENTIAL HYPERTENSION: ICD-10-CM

## 2018-07-11 DIAGNOSIS — E78.2 MIXED HYPERLIPIDEMIA: ICD-10-CM

## 2018-07-11 NOTE — TELEPHONE ENCOUNTER
Was the patient seen in the last year in this department? Yes     Does patient have an active prescription for medications requested? No     Received Request Via: Pharmacy      Pt met protocol?: Yes pt last ov 5/18   BP Readings from Last 1 Encounters:   05/30/18 (!) 166/70

## 2018-07-12 RX ORDER — LOSARTAN POTASSIUM 50 MG/1
TABLET ORAL
Qty: 90 TAB | Refills: 0 | Status: SHIPPED | OUTPATIENT
Start: 2018-07-12 | End: 2018-11-13 | Stop reason: SDUPTHER

## 2018-07-28 DIAGNOSIS — E78.2 MIXED HYPERLIPIDEMIA: ICD-10-CM

## 2018-07-30 RX ORDER — ROSUVASTATIN CALCIUM 10 MG/1
TABLET, COATED ORAL
Qty: 90 TAB | Refills: 3 | Status: SHIPPED | OUTPATIENT
Start: 2018-07-30 | End: 2018-11-13 | Stop reason: SDUPTHER

## 2018-08-21 NOTE — TELEPHONE ENCOUNTER
Last seen by PCP 5/18. Will send 3 months to pharmacy. Patient is due for an appointment. Please schedule.

## 2018-09-10 ENCOUNTER — OFFICE VISIT (OUTPATIENT)
Dept: DERMATOLOGY | Facility: IMAGING CENTER | Age: 83
End: 2018-09-10
Payer: MEDICARE

## 2018-09-10 DIAGNOSIS — D18.01 CHERRY ANGIOMA: ICD-10-CM

## 2018-09-10 DIAGNOSIS — L57.0 ACTINIC KERATOSES: ICD-10-CM

## 2018-09-10 DIAGNOSIS — Z85.828 HISTORY OF BASAL CELL CARCINOMA: ICD-10-CM

## 2018-09-10 DIAGNOSIS — L82.1 SEBORRHEIC KERATOSES: ICD-10-CM

## 2018-09-10 PROCEDURE — 17003 DESTRUCT PREMALG LES 2-14: CPT | Performed by: DERMATOLOGY

## 2018-09-10 PROCEDURE — 17000 DESTRUCT PREMALG LESION: CPT | Performed by: DERMATOLOGY

## 2018-09-10 PROCEDURE — 99212 OFFICE O/P EST SF 10 MIN: CPT | Mod: 25 | Performed by: DERMATOLOGY

## 2018-09-10 ASSESSMENT — ENCOUNTER SYMPTOMS
FEVER: 0
CHILLS: 0

## 2018-09-10 NOTE — PROGRESS NOTES
Dermatology Return Patient Visit    Chief Complaint   Patient presents with   • Follow-Up       Subjective:     HPI:   Nieves Busch is a 85 y.o. female presenting for    Follow up , JAYDE   S/p excision BCC 05/2018  Unsure of any new/specific concerns    Several brown spots on face, extremities  Increasing in number over the years  No itching/bleeding/pain    Red spots on the body  Increasing in number over the years  None with recent change in size  No itching/bleeding/pain  No treatments    History of skin cancer: Yes, Details:  Above, + Basal 20 years ago , left forehead  History of biopsies:Yes, Details: above  History of blistering/severe sunburns:Yes, Details:  During youth  Family history of skin cancer:Yes, Details: unknown  Family history of atypical moles:No        Past Medical History:   Diagnosis Date   • Essential hypertension 9/25/2015       Current Outpatient Prescriptions on File Prior to Visit   Medication Sig Dispense Refill   • umeclidinium-vilanterol (ANORO ELLIPTA) 62.5-25 MCG/INH AEROSOL POWDER, BREATH ACTIVATED inhaler Inhale 1 Puff by mouth every day. 3 Inhaler 0   • rosuvastatin (CRESTOR) 10 MG Tab TAKE 1 TABLET EVERY EVENING 90 Tab 3   • losartan (COZAAR) 50 MG Tab TAKE 1 TABLET DAILY 90 Tab 0   • alendronate (FOSAMAX) 70 MG Tab Take 1 Tab by mouth every 7 days. 12 Tab 3   • albuterol (VENTOLIN OR PROVENTIL) 108 (90 BASE) MCG/ACT Aero Soln inhalation aerosol Inhale 2 Puffs by mouth every 6 hours as needed for Shortness of Breath. 3 Inhaler 3   • Cholecalciferol (VITAMIN D-3) 5000 UNITS Tab Take  by mouth.     • aspirin 81 MG tablet Take 81 mg by mouth every day.       No current facility-administered medications on file prior to visit.        No Known Allergies    Family History   Problem Relation Age of Onset   • Cancer Daughter    • Cancer Father         Lungs   • Other Father         Tobbacco   • Alcohol/Drug Mother        Social History     Social History   • Marital status:       Spouse name: N/A   • Number of children: N/A   • Years of education: N/A     Occupational History   • Not on file.     Social History Main Topics   • Smoking status: Never Smoker   • Smokeless tobacco: Never Used      Comment: significant 2nd hand smoke exposure.    • Alcohol use No   • Drug use: No   • Sexual activity: Not Currently     Partners: Male      Comment:       Other Topics Concern   • Not on file     Social History Narrative   • No narrative on file       Review of Systems   Constitutional: Negative for chills and fever.   Skin: Negative for itching and rash.   All other systems reviewed and are negative.       Objective:     A full mucocutaneous exam was completed including: scalp, hair, ears, face, eyelids, conjunctiva, lips, gums/tongue/oropharynx, neck, chest breasts, abdomen, back, left and right upper extremities (including hands/digits and fingernails), left and right lower extremities (including feet/toes, toenails), buttocks, excluding external genitalia (patient refusal) with the following pertinent findings listed below. Remaining above-listed examined areas within normal limits / negative for rashes or lesions.      There were no vitals taken for this visit.    Physical Exam   Constitutional: She is oriented to person, place, and time and well-developed, well-nourished, and in no distress.   HENT:   Head: Normocephalic and atraumatic.       Right Ear: External ear normal.   Left Ear: External ear normal.   Nose: Nose normal.   Mouth/Throat: Oropharynx is clear and moist.   Eyes: Conjunctivae and lids are normal.   Neck: Normal range of motion. Neck supple.   Cardiovascular: Intact distal pulses.    Pulmonary/Chest: Effort normal.   Neurological: She is alert and oriented to person, place, and time.   Skin: Skin is warm and dry.        Psychiatric: Mood and affect normal.       DATA: none applicable to review    Assessment and Plan:     1. Actinic  keratoses  CRYOTHERAPY:  Risks (including, but not limited to: hypo or hyperpigmentation, redness, blister, blood blister, recurrence, need for further treatment, infection, scar) and benefits of cryotherapy discussed. Patient verbally agreed to proceed with treatment. 2 cryotherapy freeze thaw cycles of 10 seconds were applied to 2 lesions on the scalp, face with cryac. Patient tolerated procedure well. Aftercare instructions given.    2. History of basal cell carcinoma  Skin cancer education  - discussed importance of sun protective clothing, eyewear  - discussed importance of daily use of broad spectrum sunscreen with SPF 30 or greater, as well as need for reapplication ~every 2 hours when exposed to UVR  - discussed importance of regular self-exams, ideally once per month, every 4-6 months exams in clinic  - ABCDE's of melanoma discussed  - patient to bring any new or concerning lesions to my attention    3. Seborrheic keratoses  - Benign-appearing nature of lesions discussed. Advised to return to clinic for any new or concerning changes.  - ABCDE's of melanoma discussed    4. Cherry angiomas  - Benign-appearing nature of lesions discussed. Advised to return to clinic for any new or concerning changes.    Followup: Return for glendy 4-6 months.    Destiny Wade M.D.

## 2018-09-24 DIAGNOSIS — M81.0 AGE-RELATED OSTEOPOROSIS WITHOUT CURRENT PATHOLOGICAL FRACTURE: ICD-10-CM

## 2018-09-25 RX ORDER — ALENDRONATE SODIUM 70 MG/1
TABLET ORAL
Qty: 12 TAB | Refills: 0 | Status: SHIPPED | OUTPATIENT
Start: 2018-09-25 | End: 2018-11-13 | Stop reason: SDUPTHER

## 2018-09-25 NOTE — TELEPHONE ENCOUNTER
Was the patient seen in the last year in this department? Yes    Does patient have an active prescription for medications requested? No     Received Request Via: Pharmacy      Pt met protocol?: Yes, last ov 5/8/18   Last labs 7/20/17

## 2018-10-18 ENCOUNTER — HOSPITAL ENCOUNTER (OUTPATIENT)
Dept: LAB | Facility: MEDICAL CENTER | Age: 83
End: 2018-10-18
Attending: FAMILY MEDICINE
Payer: MEDICARE

## 2018-10-18 ENCOUNTER — TELEPHONE (OUTPATIENT)
Dept: MEDICAL GROUP | Facility: PHYSICIAN GROUP | Age: 83
End: 2018-10-18

## 2018-10-18 DIAGNOSIS — E78.2 MIXED HYPERLIPIDEMIA: ICD-10-CM

## 2018-10-18 DIAGNOSIS — I10 ESSENTIAL HYPERTENSION: ICD-10-CM

## 2018-10-18 LAB
ALBUMIN SERPL BCP-MCNC: 4.1 G/DL (ref 3.2–4.9)
ALBUMIN/GLOB SERPL: 1.2 G/DL
ALP SERPL-CCNC: 67 U/L (ref 30–99)
ALT SERPL-CCNC: 29 U/L (ref 2–50)
ANION GAP SERPL CALC-SCNC: 8 MMOL/L (ref 0–11.9)
AST SERPL-CCNC: 24 U/L (ref 12–45)
BILIRUB SERPL-MCNC: 0.8 MG/DL (ref 0.1–1.5)
BUN SERPL-MCNC: 19 MG/DL (ref 8–22)
CALCIUM SERPL-MCNC: 9.8 MG/DL (ref 8.5–10.5)
CHLORIDE SERPL-SCNC: 101 MMOL/L (ref 96–112)
CHOLEST SERPL-MCNC: 201 MG/DL (ref 100–199)
CO2 SERPL-SCNC: 28 MMOL/L (ref 20–33)
CREAT SERPL-MCNC: 1.08 MG/DL (ref 0.5–1.4)
ERYTHROCYTE [DISTWIDTH] IN BLOOD BY AUTOMATED COUNT: 46.2 FL (ref 35.9–50)
FASTING STATUS PATIENT QL REPORTED: NORMAL
GLOBULIN SER CALC-MCNC: 3.4 G/DL (ref 1.9–3.5)
GLUCOSE SERPL-MCNC: 104 MG/DL (ref 65–99)
HCT VFR BLD AUTO: 45.3 % (ref 37–47)
HDLC SERPL-MCNC: 100 MG/DL
HGB BLD-MCNC: 14.1 G/DL (ref 12–16)
LDLC SERPL CALC-MCNC: 84 MG/DL
MCH RBC QN AUTO: 29.4 PG (ref 27–33)
MCHC RBC AUTO-ENTMCNC: 31.1 G/DL (ref 33.6–35)
MCV RBC AUTO: 94.6 FL (ref 81.4–97.8)
PLATELET # BLD AUTO: 240 K/UL (ref 164–446)
PMV BLD AUTO: 10.7 FL (ref 9–12.9)
POTASSIUM SERPL-SCNC: 4.2 MMOL/L (ref 3.6–5.5)
PROT SERPL-MCNC: 7.5 G/DL (ref 6–8.2)
RBC # BLD AUTO: 4.79 M/UL (ref 4.2–5.4)
SODIUM SERPL-SCNC: 137 MMOL/L (ref 135–145)
TRIGL SERPL-MCNC: 83 MG/DL (ref 0–149)
WBC # BLD AUTO: 4.8 K/UL (ref 4.8–10.8)

## 2018-10-18 PROCEDURE — 80061 LIPID PANEL: CPT

## 2018-10-18 PROCEDURE — 85027 COMPLETE CBC AUTOMATED: CPT

## 2018-10-18 PROCEDURE — 80053 COMPREHEN METABOLIC PANEL: CPT

## 2018-10-18 PROCEDURE — 36415 COLL VENOUS BLD VENIPUNCTURE: CPT

## 2018-10-19 NOTE — TELEPHONE ENCOUNTER
----- Message from Selene Carrington M.D. sent at 10/18/2018  5:19 PM PDT -----  [facility] labs.

## 2018-11-13 ENCOUNTER — OFFICE VISIT (OUTPATIENT)
Dept: MEDICAL GROUP | Facility: PHYSICIAN GROUP | Age: 83
End: 2018-11-13
Payer: MEDICARE

## 2018-11-13 VITALS
OXYGEN SATURATION: 92 % | BODY MASS INDEX: 22.71 KG/M2 | WEIGHT: 133 LBS | TEMPERATURE: 98.5 F | HEIGHT: 64 IN | DIASTOLIC BLOOD PRESSURE: 86 MMHG | RESPIRATION RATE: 18 BRPM | HEART RATE: 100 BPM | SYSTOLIC BLOOD PRESSURE: 160 MMHG

## 2018-11-13 DIAGNOSIS — J44.9 CHRONIC OBSTRUCTIVE PULMONARY DISEASE, UNSPECIFIED COPD TYPE (HCC): ICD-10-CM

## 2018-11-13 DIAGNOSIS — I10 WHITE COAT SYNDROME WITH DIAGNOSIS OF HYPERTENSION: ICD-10-CM

## 2018-11-13 DIAGNOSIS — C44.91 SKIN CANCER, BASAL CELL: ICD-10-CM

## 2018-11-13 DIAGNOSIS — E78.2 MIXED HYPERLIPIDEMIA: ICD-10-CM

## 2018-11-13 DIAGNOSIS — M81.0 AGE-RELATED OSTEOPOROSIS WITHOUT CURRENT PATHOLOGICAL FRACTURE: ICD-10-CM

## 2018-11-13 PROCEDURE — 99214 OFFICE O/P EST MOD 30 MIN: CPT | Performed by: FAMILY MEDICINE

## 2018-11-13 RX ORDER — ALBUTEROL SULFATE 90 UG/1
2 AEROSOL, METERED RESPIRATORY (INHALATION) EVERY 6 HOURS PRN
Qty: 3 INHALER | Refills: 3 | Status: SHIPPED | OUTPATIENT
Start: 2018-11-13 | End: 2020-09-15 | Stop reason: SDUPTHER

## 2018-11-13 RX ORDER — ROSUVASTATIN CALCIUM 10 MG/1
TABLET, COATED ORAL
Qty: 90 TAB | Refills: 3 | Status: SHIPPED | OUTPATIENT
Start: 2018-11-13 | End: 2019-05-22 | Stop reason: SDUPTHER

## 2018-11-13 RX ORDER — LOSARTAN POTASSIUM 50 MG/1
50 TABLET ORAL DAILY
Qty: 90 TAB | Refills: 3 | Status: SHIPPED | OUTPATIENT
Start: 2018-11-13 | End: 2019-03-13

## 2018-11-13 RX ORDER — ALENDRONATE SODIUM 70 MG/1
TABLET ORAL
Qty: 12 TAB | Refills: 3 | Status: SHIPPED | OUTPATIENT
Start: 2018-11-13 | End: 2019-11-15 | Stop reason: SDUPTHER

## 2018-11-13 ASSESSMENT — PATIENT HEALTH QUESTIONNAIRE - PHQ9: CLINICAL INTERPRETATION OF PHQ2 SCORE: 0

## 2018-11-13 NOTE — PROGRESS NOTES
Chief Complaint   Patient presents with   • COPD   • Results     labs fv        HISTORY OF PRESENT ILLNESS: Patient is a 85 y.o. female established patient here today for the following concerns:    1. White coat syndrome with diagnosis of hypertension  Here for follow up. Continues to monitor occasionally at home and finds pressure is always quite a bit lower than here.  No CP, SOB, or leg swelling.  Following up with cardiology next week.     2. Chronic obstructive pulmonary disease, unspecified COPD type (HCC)  Continues on Anoro and albuterol for rescue.  No recent exacerbations.      3. Age-related osteoporosis without current pathological fracture  Continues on Fosamax weekly with good tolerability and no GI side effects.     4. Mixed hyperlipidemia  Continue on rosuvastatin.  No myalgias.  No CP.  Controlled on most recent labs.      5. Skin cancer, basal cell  No on surveillance s/p excision biopsy.       Past Medical, Social, and Family history reviewed and updated in EPIC    Allergies:Patient has no known allergies.    Current Outpatient Prescriptions   Medication Sig Dispense Refill   • alendronate (FOSAMAX) 70 MG Tab TAKE 1 TABLET EVERY 7 DAYS 12 Tab 3   • umeclidinium-vilanterol (ANORO ELLIPTA) 62.5-25 MCG/INH AEROSOL POWDER, BREATH ACTIVATED inhaler Inhale 1 Puff by mouth every day. 3 Inhaler 3   • rosuvastatin (CRESTOR) 10 MG Tab TAKE 1 TABLET EVERY EVENING 90 Tab 3   • losartan (COZAAR) 50 MG Tab Take 1 Tab by mouth every day. 90 Tab 3   • albuterol 108 (90 Base) MCG/ACT Aero Soln inhalation aerosol Inhale 2 Puffs by mouth every 6 hours as needed for Shortness of Breath. 3 Inhaler 3   • Cholecalciferol (VITAMIN D-3) 5000 UNITS Tab Take  by mouth.     • aspirin 81 MG tablet Take 81 mg by mouth every day.       No current facility-administered medications for this visit.          ROS:  Review of Systems   Constitutional: Negative for fever, chills, weight loss and malaise/fatigue.   HENT: Negative for  "ear pain, nosebleeds, congestion, sore throat and neck pain.    Eyes: Negative for blurred vision.   Respiratory: Negative for cough, sputum production, shortness of breath and wheezing.    Cardiovascular: Negative for chest pain, palpitations,  and leg swelling.   Gastrointestinal: Negative for heartburn, nausea, vomiting, diarrhea and abdominal pain.   Genitourinary: Negative for dysuria, urgency and frequency.   Musculoskeletal: Negative for myalgias, back pain and joint pain.   Skin: Negative for rash and itching.   Neurological: Negative for dizziness, tingling, tremors, sensory change, focal weakness and headaches.   Endo/Heme/Allergies: Does not bruise/bleed easily.   Psychiatric/Behavioral: Negative for depression, anxiety, suicidal ideas, insomnia and memory loss.      Exam:  Blood pressure 160/86, pulse 100, temperature 36.9 °C (98.5 °F), resp. rate 18, height 1.626 m (5' 4\"), weight 60.3 kg (133 lb), SpO2 92 %, not currently breastfeeding.    General:  Well nourished, well developed in NAD  Head is grossly normal.  Neck: Supple without JVD   Pulmonary:  Normal effort. CTAB  Cardiovascular: Regular rate and rhythm   Extremities: no clubbing, cyanosis, or edema.  Psych: affect appropriate      Please note that this dictation was created using voice recognition software. I have made every reasonable attempt to correct obvious errors, but I expect that there are errors of grammar and possibly content that I did not discover before finalizing the note.    Assessment/Plan:  1. White coat syndrome with diagnosis of hypertension  Continue losartan    2. Chronic obstructive pulmonary disease, unspecified COPD type (HCC)  Continue inhalers    3. Age-related osteoporosis without current pathological fracture  Continue   - alendronate (FOSAMAX) 70 MG Tab; TAKE 1 TABLET EVERY 7 DAYS  Dispense: 12 Tab; Refill: 3    4. Mixed hyperlipidemia  Continue   - rosuvastatin (CRESTOR) 10 MG Tab; TAKE 1 TABLET EVERY EVENING  " Dispense: 90 Tab; Refill: 3    5. Skin cancer, basal cell  Continue surveillance at least every 6-12 months with Derm and show us any new skin lesions.     Follow up 3-6 months

## 2018-11-21 ENCOUNTER — OFFICE VISIT (OUTPATIENT)
Dept: CARDIOLOGY | Facility: MEDICAL CENTER | Age: 83
End: 2018-11-21
Payer: MEDICARE

## 2018-11-21 VITALS
WEIGHT: 132.6 LBS | HEART RATE: 102 BPM | HEIGHT: 64 IN | SYSTOLIC BLOOD PRESSURE: 142 MMHG | OXYGEN SATURATION: 90 % | DIASTOLIC BLOOD PRESSURE: 64 MMHG | BODY MASS INDEX: 22.64 KG/M2

## 2018-11-21 DIAGNOSIS — J44.9 CHRONIC OBSTRUCTIVE PULMONARY DISEASE, UNSPECIFIED COPD TYPE (HCC): ICD-10-CM

## 2018-11-21 DIAGNOSIS — G47.34 NOCTURNAL HYPOXIA: ICD-10-CM

## 2018-11-21 DIAGNOSIS — I27.20 PULMONARY HYPERTENSION (HCC): ICD-10-CM

## 2018-11-21 DIAGNOSIS — J96.11 CHRONIC RESPIRATORY FAILURE WITH HYPOXIA (HCC): ICD-10-CM

## 2018-11-21 DIAGNOSIS — E78.2 MIXED HYPERLIPIDEMIA: ICD-10-CM

## 2018-11-21 PROCEDURE — 99214 OFFICE O/P EST MOD 30 MIN: CPT | Performed by: INTERNAL MEDICINE

## 2018-11-21 RX ORDER — INFLUENZA A VIRUS A/MICHIGAN/45/2015 X-275 (H1N1) ANTIGEN (FORMALDEHYDE INACTIVATED), INFLUENZA A VIRUS A/SINGAPORE/INFIMH-16-0019/2016 IVR-186 (H3N2) ANTIGEN (FORMALDEHYDE INACTIVATED), AND INFLUENZA B VIRUS B/MARYLAND/15/2016 BX-69A (A B/COLORADO/6/2017-LIKE VIRUS) ANTIGEN (FORMALDEHYDE INACTIVATED) 60; 60; 60 UG/.5ML; UG/.5ML; UG/.5ML
INJECTION, SUSPENSION INTRAMUSCULAR
Refills: 0 | COMMUNITY
Start: 2018-09-17 | End: 2018-11-21

## 2018-11-21 ASSESSMENT — ENCOUNTER SYMPTOMS
MUSCULOSKELETAL NEGATIVE: 1
STRIDOR: 0
LOSS OF CONSCIOUSNESS: 0
ORTHOPNEA: 0
CHILLS: 0
SHORTNESS OF BREATH: 0
PND: 0
WEAKNESS: 0
PALPITATIONS: 0
CLAUDICATION: 0
SPUTUM PRODUCTION: 0
BRUISES/BLEEDS EASILY: 0
GASTROINTESTINAL NEGATIVE: 1
RESPIRATORY NEGATIVE: 1
WHEEZING: 0
FEVER: 0
SORE THROAT: 0
CONSTITUTIONAL NEGATIVE: 1
NEUROLOGICAL NEGATIVE: 1
HEMOPTYSIS: 0
DIZZINESS: 0
EYES NEGATIVE: 1
CARDIOVASCULAR NEGATIVE: 1
COUGH: 0

## 2018-11-21 NOTE — PROGRESS NOTES
Chief Complaint   Patient presents with   • Pulmonary Hypertension     x6mon. f/v   • Hyperlipidemia       Subjective:   Nieves Busch is a 85 y.o. female who presents today as a follow-up for her pulmonary hypertension.  Since I last saw her she continues to do well with no worsening shortness of breath.  She is compliant with her oxygen at night.  She is try to get a daytime oxygen concentrator but is been having issues with this.  They are currently in the stages of picking out a new one.  Otherwise she said no lower extremity edema.  She is had no chest pain palpitations or PND.    Past Medical History:   Diagnosis Date   • Essential hypertension 9/25/2015     Past Surgical History:   Procedure Laterality Date   • CATARACT PHACO WITH IOL  8/20/2013    Performed by Wagner Gomez M.D. at [facility]   • CATARACT PHACO WITH IOL  8/6/2013    Performed by Wagner Gomez M.D. at [facility]     Family History   Problem Relation Age of Onset   • Cancer Daughter    • Cancer Father         Lungs   • Other Father         Tobbacco   • Alcohol/Drug Mother      Social History     Social History   • Marital status:      Spouse name: N/A   • Number of children: N/A   • Years of education: N/A     Occupational History   • Not on file.     Social History Main Topics   • Smoking status: Never Smoker   • Smokeless tobacco: Never Used      Comment: significant 2nd hand smoke exposure.    • Alcohol use No   • Drug use: No   • Sexual activity: Not Currently     Partners: Male      Comment:       Other Topics Concern   • Not on file     Social History Narrative   • No narrative on file     No Known Allergies  Outpatient Encounter Prescriptions as of 11/21/2018   Medication Sig Dispense Refill   • alendronate (FOSAMAX) 70 MG Tab TAKE 1 TABLET EVERY 7 DAYS 12 Tab 3   • umeclidinium-vilanterol (ANORO ELLIPTA) 62.5-25 MCG/INH AEROSOL POWDER, BREATH ACTIVATED inhaler Inhale 1 Puff by  "mouth every day. 3 Inhaler 3   • rosuvastatin (CRESTOR) 10 MG Tab TAKE 1 TABLET EVERY EVENING 90 Tab 3   • losartan (COZAAR) 50 MG Tab Take 1 Tab by mouth every day. 90 Tab 3   • Cholecalciferol (VITAMIN D-3) 5000 UNITS Tab Take  by mouth.     • aspirin 81 MG tablet Take 81 mg by mouth every day.     • [DISCONTINUED] FLUZONE HIGH-DOSE 0.5 ML Suspension Prefilled Syringe injection TO BE ADMINISTERED BY PHARMACIST FOR IMMUNIZATION  0   • albuterol 108 (90 Base) MCG/ACT Aero Soln inhalation aerosol Inhale 2 Puffs by mouth every 6 hours as needed for Shortness of Breath. 3 Inhaler 3     No facility-administered encounter medications on file as of 11/21/2018.      Review of Systems   Constitutional: Negative.  Negative for chills, fever and malaise/fatigue.   HENT: Negative.  Negative for sore throat.    Eyes: Negative.    Respiratory: Negative.  Negative for cough, hemoptysis, sputum production, shortness of breath, wheezing and stridor.    Cardiovascular: Negative.  Negative for chest pain, palpitations, orthopnea, claudication, leg swelling and PND.   Gastrointestinal: Negative.    Genitourinary: Negative.    Musculoskeletal: Negative.    Skin: Negative.    Neurological: Negative.  Negative for dizziness, loss of consciousness and weakness.   Endo/Heme/Allergies: Negative.  Does not bruise/bleed easily.   All other systems reviewed and are negative.       Objective:   /64 (BP Location: Left arm, Patient Position: Sitting, BP Cuff Size: Adult)   Pulse (!) 102   Ht 1.626 m (5' 4\")   Wt 60.1 kg (132 lb 9.6 oz)   SpO2 90%   BMI 22.76 kg/m²     Physical Exam   Constitutional: She appears well-developed and well-nourished. No distress.   HENT:   Head: Normocephalic and atraumatic.   Right Ear: External ear normal.   Left Ear: External ear normal.   Nose: Nose normal.   Mouth/Throat: No oropharyngeal exudate.   Eyes: Pupils are equal, round, and reactive to light. Conjunctivae and EOM are normal. Right eye " exhibits no discharge. Left eye exhibits no discharge. No scleral icterus.   Neck: Neck supple. No JVD present.   Cardiovascular: Normal rate, regular rhythm and intact distal pulses.  Exam reveals no gallop and no friction rub.    No murmur heard.  Pulmonary/Chest: Effort normal. No stridor. No respiratory distress. She has no wheezes. She has no rales. She exhibits no tenderness.   Abdominal: Soft. She exhibits no distension. There is no guarding.   Musculoskeletal: Normal range of motion. She exhibits no edema, tenderness or deformity.   Neurological: She is alert. She has normal reflexes. She displays normal reflexes. No cranial nerve deficit. She exhibits normal muscle tone. Coordination normal.   Skin: Skin is warm and dry. No rash noted. She is not diaphoretic. No erythema. No pallor.   Psychiatric: She has a normal mood and affect. Her behavior is normal. Judgment and thought content normal.   Nursing note and vitals reviewed.      Assessment:     1. Pulmonary hypertension (HCC)     2. Nocturnal hypoxia     3. Mixed hyperlipidemia     4. Chronic obstructive pulmonary disease, unspecified COPD type (HCC)     5. Chronic respiratory failure with hypoxia (HCC)         Medical Decision Making:  Today's Assessment / Status / Plan:     85-year-old female with pulmonary hypertension from long-standing COPD.  We did go over her oxygen concentrator's and different choices that would be available for her.  I do not necessarily have a recommendation made for oxygen concentrator however we did discuss the benefits and alternatives of each 1.  I have no changes to her medical therapy today.  We will see her back in 6 months.    Thank for you allowing me to take part in your patient's care, please call should you have any questions or would like to discuss this patient.

## 2018-11-21 NOTE — LETTER
[facility]   [address], [address]  [place] [address]  Phone: [phone]  Fax: [phone]              Nieves Busch  12/25/1932    Encounter Date: 11/21/2018    Korey Forde M.D.          PROGRESS NOTE:  Chief Complaint   Patient presents with   • Pulmonary Hypertension     x6mon. f/v   • Hyperlipidemia       Subjective:   Nieves Busch is a 85 y.o. female who presents today as a follow-up for her pulmonary hypertension.  Since I last saw her she continues to do well with no worsening shortness of breath.  She is compliant with her oxygen at night.  She is try to get a daytime oxygen concentrator but is been having issues with this.  They are currently in the stages of picking out a new one.  Otherwise she said no lower extremity edema.  She is had no chest pain palpitations or PND.    Past Medical History:   Diagnosis Date   • Essential hypertension 9/25/2015     Past Surgical History:   Procedure Laterality Date   • CATARACT PHACO WITH IOL  8/20/2013    Performed by Wagner Gomez M.D. at [facility]   • CATARACT PHACO WITH IOL  8/6/2013    Performed by Wagner Gomez M.D. at [facility]     Family History   Problem Relation Age of Onset   • Cancer Daughter    • Cancer Father         Lungs   • Other Father         Tobbacco   • Alcohol/Drug Mother      Social History     Social History   • Marital status:      Spouse name: N/A   • Number of children: N/A   • Years of education: N/A     Occupational History   • Not on file.     Social History Main Topics   • Smoking status: Never Smoker   • Smokeless tobacco: Never Used      Comment: significant 2nd hand smoke exposure.    • Alcohol use No   • Drug use: No   • Sexual activity: Not Currently     Partners: Male      Comment:       Other Topics Concern   • Not on file     Social History Narrative   • No narrative on file     No Known  "Allergies  Outpatient Encounter Prescriptions as of 11/21/2018   Medication Sig Dispense Refill   • alendronate (FOSAMAX) 70 MG Tab TAKE 1 TABLET EVERY 7 DAYS 12 Tab 3   • umeclidinium-vilanterol (ANORO ELLIPTA) 62.5-25 MCG/INH AEROSOL POWDER, BREATH ACTIVATED inhaler Inhale 1 Puff by mouth every day. 3 Inhaler 3   • rosuvastatin (CRESTOR) 10 MG Tab TAKE 1 TABLET EVERY EVENING 90 Tab 3   • losartan (COZAAR) 50 MG Tab Take 1 Tab by mouth every day. 90 Tab 3   • Cholecalciferol (VITAMIN D-3) 5000 UNITS Tab Take  by mouth.     • aspirin 81 MG tablet Take 81 mg by mouth every day.     • [DISCONTINUED] FLUZONE HIGH-DOSE 0.5 ML Suspension Prefilled Syringe injection TO BE ADMINISTERED BY PHARMACIST FOR IMMUNIZATION  0   • albuterol 108 (90 Base) MCG/ACT Aero Soln inhalation aerosol Inhale 2 Puffs by mouth every 6 hours as needed for Shortness of Breath. 3 Inhaler 3     No facility-administered encounter medications on file as of 11/21/2018.      Review of Systems   Constitutional: Negative.  Negative for chills, fever and malaise/fatigue.   HENT: Negative.  Negative for sore throat.    Eyes: Negative.    Respiratory: Negative.  Negative for cough, hemoptysis, sputum production, shortness of breath, wheezing and stridor.    Cardiovascular: Negative.  Negative for chest pain, palpitations, orthopnea, claudication, leg swelling and PND.   Gastrointestinal: Negative.    Genitourinary: Negative.    Musculoskeletal: Negative.    Skin: Negative.    Neurological: Negative.  Negative for dizziness, loss of consciousness and weakness.   Endo/Heme/Allergies: Negative.  Does not bruise/bleed easily.   All other systems reviewed and are negative.       Objective:   /64 (BP Location: Left arm, Patient Position: Sitting, BP Cuff Size: Adult)   Pulse (!) 102   Ht 1.626 m (5' 4\")   Wt 60.1 kg (132 lb 9.6 oz)   SpO2 90%   BMI 22.76 kg/m²      Physical Exam   Constitutional: She appears well-developed and well-nourished. No " distress.   HENT:   Head: Normocephalic and atraumatic.   Right Ear: External ear normal.   Left Ear: External ear normal.   Nose: Nose normal.   Mouth/Throat: No oropharyngeal exudate.   Eyes: Pupils are equal, round, and reactive to light. Conjunctivae and EOM are normal. Right eye exhibits no discharge. Left eye exhibits no discharge. No scleral icterus.   Neck: Neck supple. No JVD present.   Cardiovascular: Normal rate, regular rhythm and intact distal pulses.  Exam reveals no gallop and no friction rub.    No murmur heard.  Pulmonary/Chest: Effort normal. No stridor. No respiratory distress. She has no wheezes. She has no rales. She exhibits no tenderness.   Abdominal: Soft. She exhibits no distension. There is no guarding.   Musculoskeletal: Normal range of motion. She exhibits no edema, tenderness or deformity.   Neurological: She is alert. She has normal reflexes. She displays normal reflexes. No cranial nerve deficit. She exhibits normal muscle tone. Coordination normal.   Skin: Skin is warm and dry. No rash noted. She is not diaphoretic. No erythema. No pallor.   Psychiatric: She has a normal mood and affect. Her behavior is normal. Judgment and thought content normal.   Nursing note and vitals reviewed.      Assessment:     1. Pulmonary hypertension (HCC)     2. Nocturnal hypoxia     3. Mixed hyperlipidemia     4. Chronic obstructive pulmonary disease, unspecified COPD type (HCC)     5. Chronic respiratory failure with hypoxia (HCC)         Medical Decision Making:  Today's Assessment / Status / Plan:     85-year-old female with pulmonary hypertension from long-standing COPD.  We did go over her oxygen concentrator's and different choices that would be available for her.  I do not necessarily have a recommendation made for oxygen concentrator however we did discuss the benefits and alternatives of each 1.  I have no changes to her medical therapy today.  We will see her back in 6 months.    Thank for you  allowing me to take part in your patient's care, please call should you have any questions or would like to discuss this patient.      Selene Carrington M.D.  [address] [place] [address]  [address] [place] [id number]  VIA In Basket

## 2019-03-13 ENCOUNTER — TELEPHONE (OUTPATIENT)
Dept: MEDICAL GROUP | Facility: PHYSICIAN GROUP | Age: 84
End: 2019-03-13

## 2019-03-13 RX ORDER — LISINOPRIL 20 MG/1
20 TABLET ORAL DAILY
Qty: 90 TAB | Refills: 3 | Status: SHIPPED | OUTPATIENT
Start: 2019-03-13 | End: 2020-02-19

## 2019-03-13 NOTE — TELEPHONE ENCOUNTER
Patient came in and dropped off a form that was mailed to her from [employer].  The Losartan that she is on has been recalled.  Letter scanned into media.  Please advise.      Thank you.

## 2019-05-15 ENCOUNTER — OFFICE VISIT (OUTPATIENT)
Dept: MEDICAL GROUP | Facility: PHYSICIAN GROUP | Age: 84
End: 2019-05-15
Payer: MEDICARE

## 2019-05-15 VITALS
BODY MASS INDEX: 21.34 KG/M2 | HEIGHT: 64 IN | DIASTOLIC BLOOD PRESSURE: 82 MMHG | HEART RATE: 102 BPM | SYSTOLIC BLOOD PRESSURE: 148 MMHG | TEMPERATURE: 97.1 F | OXYGEN SATURATION: 91 % | RESPIRATION RATE: 18 BRPM | WEIGHT: 125 LBS

## 2019-05-15 DIAGNOSIS — J44.9 CHRONIC OBSTRUCTIVE PULMONARY DISEASE, UNSPECIFIED COPD TYPE (HCC): ICD-10-CM

## 2019-05-15 DIAGNOSIS — I27.20 PULMONARY HYPERTENSION (HCC): ICD-10-CM

## 2019-05-15 DIAGNOSIS — J96.11 CHRONIC RESPIRATORY FAILURE WITH HYPOXIA (HCC): ICD-10-CM

## 2019-05-15 DIAGNOSIS — E78.2 MIXED HYPERLIPIDEMIA: ICD-10-CM

## 2019-05-15 DIAGNOSIS — I10 WHITE COAT SYNDROME WITH DIAGNOSIS OF HYPERTENSION: ICD-10-CM

## 2019-05-15 PROCEDURE — 99214 OFFICE O/P EST MOD 30 MIN: CPT | Performed by: FAMILY MEDICINE

## 2019-05-15 RX ORDER — ROSUVASTATIN CALCIUM 10 MG/1
TABLET, COATED ORAL
COMMUNITY
Start: 2019-03-11 | End: 2019-05-13

## 2019-05-15 RX ORDER — LOSARTAN POTASSIUM 50 MG/1
TABLET ORAL
COMMUNITY
Start: 2019-04-21 | End: 2019-05-13

## 2019-05-15 ASSESSMENT — PATIENT HEALTH QUESTIONNAIRE - PHQ9: CLINICAL INTERPRETATION OF PHQ2 SCORE: 0

## 2019-05-22 ENCOUNTER — OFFICE VISIT (OUTPATIENT)
Dept: CARDIOLOGY | Facility: MEDICAL CENTER | Age: 84
End: 2019-05-22
Payer: MEDICARE

## 2019-05-22 VITALS
OXYGEN SATURATION: 93 % | SYSTOLIC BLOOD PRESSURE: 152 MMHG | HEIGHT: 64 IN | BODY MASS INDEX: 21.85 KG/M2 | WEIGHT: 128 LBS | DIASTOLIC BLOOD PRESSURE: 80 MMHG | HEART RATE: 102 BPM

## 2019-05-22 DIAGNOSIS — E78.2 MIXED HYPERLIPIDEMIA: ICD-10-CM

## 2019-05-22 DIAGNOSIS — G47.34 NOCTURNAL HYPOXIA: ICD-10-CM

## 2019-05-22 DIAGNOSIS — I27.20 PULMONARY HYPERTENSION (HCC): ICD-10-CM

## 2019-05-22 DIAGNOSIS — J44.9 CHRONIC OBSTRUCTIVE PULMONARY DISEASE, UNSPECIFIED COPD TYPE (HCC): ICD-10-CM

## 2019-05-22 PROCEDURE — 99214 OFFICE O/P EST MOD 30 MIN: CPT | Performed by: INTERNAL MEDICINE

## 2019-05-22 RX ORDER — ROSUVASTATIN CALCIUM 10 MG/1
TABLET, COATED ORAL
Qty: 90 TAB | Refills: 3
Start: 2019-05-22 | End: 2019-12-08 | Stop reason: SDUPTHER

## 2019-05-22 RX ORDER — ROSUVASTATIN CALCIUM 10 MG/1
TABLET, COATED ORAL
COMMUNITY
Start: 2019-05-17 | End: 2019-05-22

## 2019-05-22 ASSESSMENT — ENCOUNTER SYMPTOMS
COUGH: 0
STRIDOR: 0
NEUROLOGICAL NEGATIVE: 1
CHILLS: 0
MUSCULOSKELETAL NEGATIVE: 1
SORE THROAT: 0
GASTROINTESTINAL NEGATIVE: 1
EYES NEGATIVE: 1
PND: 0
CONSTITUTIONAL NEGATIVE: 1
SHORTNESS OF BREATH: 0
BRUISES/BLEEDS EASILY: 0
SPUTUM PRODUCTION: 0
PALPITATIONS: 0
RESPIRATORY NEGATIVE: 1
LOSS OF CONSCIOUSNESS: 0
WEAKNESS: 0
CARDIOVASCULAR NEGATIVE: 1
FEVER: 0
WHEEZING: 0
CLAUDICATION: 0
HEMOPTYSIS: 0
DIZZINESS: 0
ORTHOPNEA: 0

## 2019-05-22 NOTE — PROGRESS NOTES
Chief Complaint   Patient presents with   • Pulmonary Hypertension     F/V: 6 MO       Subjective:   Nieves Busch is a 86 y.o. female who presents today as a follow-up for her pulmonary hypertension.  She continues on oxygen.  She continues to be physically active.  She has no chest pain or lower extremity edema.  Her blood pressure at home remains in the 130s.  She is always elevated in clinic.  She did declines further increase in her medication.  She did see her primary care who reduced her rosuvastatin to 5 because she said it was renally cleared.    Past Medical History:   Diagnosis Date   • Essential hypertension 9/25/2015     Past Surgical History:   Procedure Laterality Date   • CATARACT PHACO WITH IOL  8/20/2013    Performed by Wagner Gomez M.D. at [facility]   • CATARACT PHACO WITH IOL  8/6/2013    Performed by Wagner Gomez M.D. at [facility]     Family History   Problem Relation Age of Onset   • Cancer Daughter    • Cancer Father         Lungs   • Other Father         Tobbacco   • Alcohol/Drug Mother      Social History     Social History   • Marital status:      Spouse name: N/A   • Number of children: N/A   • Years of education: N/A     Occupational History   • Not on file.     Social History Main Topics   • Smoking status: Never Smoker   • Smokeless tobacco: Never Used      Comment: significant 2nd hand smoke exposure.    • Alcohol use No   • Drug use: No   • Sexual activity: Not Currently     Partners: Male      Comment:       Other Topics Concern   • Not on file     Social History Narrative   • No narrative on file     No Known Allergies  Outpatient Encounter Prescriptions as of 5/22/2019   Medication Sig Dispense Refill   • rosuvastatin (CRESTOR) 10 MG Tab TAKE 1 TABLET EVERY EVENING 90 Tab 3   • lisinopril (PRINIVIL) 20 MG Tab Take 1 Tab by mouth every day. 90 Tab 3   • alendronate (FOSAMAX) 70 MG Tab TAKE 1 TABLET EVERY 7 DAYS 12 Tab 3  "  • umeclidinium-vilanterol (ANORO ELLIPTA) 62.5-25 MCG/INH AEROSOL POWDER, BREATH ACTIVATED inhaler Inhale 1 Puff by mouth every day. 3 Inhaler 3   • Cholecalciferol (VITAMIN D-3) 5000 UNITS Tab Take  by mouth.     • aspirin 81 MG tablet Take 81 mg by mouth every day.     • [DISCONTINUED] rosuvastatin (CRESTOR) 10 MG Tab      • albuterol 108 (90 Base) MCG/ACT Aero Soln inhalation aerosol Inhale 2 Puffs by mouth every 6 hours as needed for Shortness of Breath. 3 Inhaler 3   • [DISCONTINUED] rosuvastatin (CRESTOR) 10 MG Tab TAKE 1 TABLET EVERY EVENING (Patient taking differently: 5 mg every day.) 90 Tab 3     No facility-administered encounter medications on file as of 5/22/2019.      Review of Systems   Constitutional: Negative.  Negative for chills, fever and malaise/fatigue.   HENT: Negative.  Negative for sore throat.    Eyes: Negative.    Respiratory: Negative.  Negative for cough, hemoptysis, sputum production, shortness of breath, wheezing and stridor.    Cardiovascular: Negative.  Negative for chest pain, palpitations, orthopnea, claudication, leg swelling and PND.   Gastrointestinal: Negative.    Genitourinary: Negative.    Musculoskeletal: Negative.    Skin: Negative.    Neurological: Negative.  Negative for dizziness, loss of consciousness and weakness.   Endo/Heme/Allergies: Negative.  Does not bruise/bleed easily.   All other systems reviewed and are negative.       Objective:   /80 (BP Location: Left arm, Patient Position: Sitting, BP Cuff Size: Adult)   Pulse (!) 102   Ht 1.626 m (5' 4\")   Wt 58.1 kg (128 lb)   SpO2 93%   BMI 21.97 kg/m²     Physical Exam   Constitutional: She appears well-developed and well-nourished. No distress.   HENT:   Head: Normocephalic and atraumatic.   Right Ear: External ear normal.   Left Ear: External ear normal.   Nose: Nose normal.   Mouth/Throat: No oropharyngeal exudate.   Eyes: Pupils are equal, round, and reactive to light. Conjunctivae and EOM are normal. " Right eye exhibits no discharge. Left eye exhibits no discharge. No scleral icterus.   Neck: Neck supple. No JVD present.   Cardiovascular: Normal rate, regular rhythm and intact distal pulses.  Exam reveals no gallop and no friction rub.    No murmur heard.  Pulmonary/Chest: Effort normal. No stridor. No respiratory distress. She has no wheezes. She has no rales. She exhibits no tenderness.   Abdominal: Soft. She exhibits no distension. There is no guarding.   Musculoskeletal: Normal range of motion. She exhibits no edema, tenderness or deformity.   Neurological: She is alert. She has normal reflexes. She displays normal reflexes. No cranial nerve deficit. She exhibits normal muscle tone. Coordination normal.   Skin: Skin is warm and dry. No rash noted. She is not diaphoretic. No erythema. No pallor.   Psychiatric: She has a normal mood and affect. Her behavior is normal. Judgment and thought content normal.   Nursing note and vitals reviewed.      Assessment:     1. Chronic obstructive pulmonary disease, unspecified COPD type (HCC)     2. Mixed hyperlipidemia  rosuvastatin (CRESTOR) 10 MG Tab   3. Pulmonary hypertension (HCC)     4. Nocturnal hypoxia         Medical Decision Making:  Today's Assessment / Status / Plan:     86-year-old female with hypertension pulmonary hypertension COPD and hyperlipidemia.  Have asked her increase her rosuvastatin back to 10 mg.  It is 90% excreted in the feces.  Therefore its hepatically metabolized.  Otherwise she will stand lisinopril her inhalers and oxygen.  We will see her back in 6 months.    COPD (chronic obstructive pulmonary disease)  Cont O2    Hyperlipidemia  Increase rosuva to 10    Nocturnal hypoxia  Cont o2    Pulmonary hypertension  Cont O2  Cont inhalers  F/U Pulm

## 2019-05-22 NOTE — LETTER
[facility]   [address] [place] [address] [address]  [place] [address]  Phone: [phone]  Fax: [phone]              Nieves Busch  12/25/1932    Encounter Date: 5/22/2019    Korey Forde M.D.          PROGRESS NOTE:  Chief Complaint   Patient presents with   • Pulmonary Hypertension     F/V: 6 MO       Subjective:   Nieves Busch is a 86 y.o. female who presents today as a follow-up for her pulmonary hypertension.  She continues on oxygen.  She continues to be physically active.  She has no chest pain or lower extremity edema.  Her blood pressure at home remains in the 130s.  She is always elevated in clinic.  She did declines further increase in her medication.  She did see her primary care who reduced her rosuvastatin to 5 because she said it was renally cleared.    Past Medical History:   Diagnosis Date   • Essential hypertension 9/25/2015     Past Surgical History:   Procedure Laterality Date   • CATARACT PHACO WITH IOL  8/20/2013    Performed by Wagner Gomez M.D. at [facility]   • CATARACT PHACO WITH IOL  8/6/2013    Performed by Wagner Gomez M.D. at [facility]     Family History   Problem Relation Age of Onset   • Cancer Daughter    • Cancer Father         Lungs   • Other Father         Tobbacco   • Alcohol/Drug Mother      Social History     Social History   • Marital status:      Spouse name: N/A   • Number of children: N/A   • Years of education: N/A     Occupational History   • Not on file.     Social History Main Topics   • Smoking status: Never Smoker   • Smokeless tobacco: Never Used      Comment: significant 2nd hand smoke exposure.    • Alcohol use No   • Drug use: No   • Sexual activity: Not Currently     Partners: Male      Comment:       Other Topics Concern   • Not on file     Social History Narrative   • No narrative on file     No Known Allergies  Outpatient Encounter  "Prescriptions as of 5/22/2019   Medication Sig Dispense Refill   • rosuvastatin (CRESTOR) 10 MG Tab TAKE 1 TABLET EVERY EVENING 90 Tab 3   • lisinopril (PRINIVIL) 20 MG Tab Take 1 Tab by mouth every day. 90 Tab 3   • alendronate (FOSAMAX) 70 MG Tab TAKE 1 TABLET EVERY 7 DAYS 12 Tab 3   • umeclidinium-vilanterol (ANORO ELLIPTA) 62.5-25 MCG/INH AEROSOL POWDER, BREATH ACTIVATED inhaler Inhale 1 Puff by mouth every day. 3 Inhaler 3   • Cholecalciferol (VITAMIN D-3) 5000 UNITS Tab Take  by mouth.     • aspirin 81 MG tablet Take 81 mg by mouth every day.     • [DISCONTINUED] rosuvastatin (CRESTOR) 10 MG Tab      • albuterol 108 (90 Base) MCG/ACT Aero Soln inhalation aerosol Inhale 2 Puffs by mouth every 6 hours as needed for Shortness of Breath. 3 Inhaler 3   • [DISCONTINUED] rosuvastatin (CRESTOR) 10 MG Tab TAKE 1 TABLET EVERY EVENING (Patient taking differently: 5 mg every day.) 90 Tab 3     No facility-administered encounter medications on file as of 5/22/2019.      Review of Systems   Constitutional: Negative.  Negative for chills, fever and malaise/fatigue.   HENT: Negative.  Negative for sore throat.    Eyes: Negative.    Respiratory: Negative.  Negative for cough, hemoptysis, sputum production, shortness of breath, wheezing and stridor.    Cardiovascular: Negative.  Negative for chest pain, palpitations, orthopnea, claudication, leg swelling and PND.   Gastrointestinal: Negative.    Genitourinary: Negative.    Musculoskeletal: Negative.    Skin: Negative.    Neurological: Negative.  Negative for dizziness, loss of consciousness and weakness.   Endo/Heme/Allergies: Negative.  Does not bruise/bleed easily.   All other systems reviewed and are negative.       Objective:   /80 (BP Location: Left arm, Patient Position: Sitting, BP Cuff Size: Adult)   Pulse (!) 102   Ht 1.626 m (5' 4\")   Wt 58.1 kg (128 lb)   SpO2 93%   BMI 21.97 kg/m²      Physical Exam   Constitutional: She appears well-developed and " well-nourished. No distress.   HENT:   Head: Normocephalic and atraumatic.   Right Ear: External ear normal.   Left Ear: External ear normal.   Nose: Nose normal.   Mouth/Throat: No oropharyngeal exudate.   Eyes: Pupils are equal, round, and reactive to light. Conjunctivae and EOM are normal. Right eye exhibits no discharge. Left eye exhibits no discharge. No scleral icterus.   Neck: Neck supple. No JVD present.   Cardiovascular: Normal rate, regular rhythm and intact distal pulses.  Exam reveals no gallop and no friction rub.    No murmur heard.  Pulmonary/Chest: Effort normal. No stridor. No respiratory distress. She has no wheezes. She has no rales. She exhibits no tenderness.   Abdominal: Soft. She exhibits no distension. There is no guarding.   Musculoskeletal: Normal range of motion. She exhibits no edema, tenderness or deformity.   Neurological: She is alert. She has normal reflexes. She displays normal reflexes. No cranial nerve deficit. She exhibits normal muscle tone. Coordination normal.   Skin: Skin is warm and dry. No rash noted. She is not diaphoretic. No erythema. No pallor.   Psychiatric: She has a normal mood and affect. Her behavior is normal. Judgment and thought content normal.   Nursing note and vitals reviewed.      Assessment:     1. Chronic obstructive pulmonary disease, unspecified COPD type (HCC)     2. Mixed hyperlipidemia  rosuvastatin (CRESTOR) 10 MG Tab   3. Pulmonary hypertension (HCC)     4. Nocturnal hypoxia         Medical Decision Making:  Today's Assessment / Status / Plan:     86-year-old female with hypertension pulmonary hypertension COPD and hyperlipidemia.  Have asked her increase her rosuvastatin back to 10 mg.  It is 90% excreted in the feces.  Therefore its hepatically metabolized.  Otherwise she will stand lisinopril her inhalers and oxygen.  We will see her back in 6 months.    COPD (chronic obstructive pulmonary disease)  Cont O2    Hyperlipidemia  Increase rosuva to  10    Nocturnal hypoxia  Cont o2    Pulmonary hypertension  Cont O2  Cont inhalers  F/U Pulm             Selene Carrington M.D.  [address] [facility]  [address]  [facility] [address]  VIA In Basket

## 2019-10-23 NOTE — TELEPHONE ENCOUNTER
Was the patient seen in the last year in this department? Yes    Does patient have an active prescription for medications requested? No     Received Request Via: Pharmacy      Pt met protocol?: Yes, OV 5/19

## 2019-11-15 ENCOUNTER — OFFICE VISIT (OUTPATIENT)
Dept: MEDICAL GROUP | Facility: PHYSICIAN GROUP | Age: 84
End: 2019-11-15
Payer: MEDICARE

## 2019-11-15 VITALS
RESPIRATION RATE: 18 BRPM | HEART RATE: 96 BPM | WEIGHT: 118.6 LBS | DIASTOLIC BLOOD PRESSURE: 82 MMHG | BODY MASS INDEX: 20.25 KG/M2 | TEMPERATURE: 99.1 F | OXYGEN SATURATION: 94 % | SYSTOLIC BLOOD PRESSURE: 140 MMHG | HEIGHT: 64 IN

## 2019-11-15 DIAGNOSIS — J96.11 CHRONIC RESPIRATORY FAILURE WITH HYPOXIA (HCC): ICD-10-CM

## 2019-11-15 DIAGNOSIS — I10 WHITE COAT SYNDROME WITH DIAGNOSIS OF HYPERTENSION: ICD-10-CM

## 2019-11-15 DIAGNOSIS — G47.34 NOCTURNAL HYPOXIA: ICD-10-CM

## 2019-11-15 DIAGNOSIS — E78.2 MIXED HYPERLIPIDEMIA: ICD-10-CM

## 2019-11-15 DIAGNOSIS — J44.9 CHRONIC OBSTRUCTIVE PULMONARY DISEASE, UNSPECIFIED COPD TYPE (HCC): ICD-10-CM

## 2019-11-15 DIAGNOSIS — I27.20 PULMONARY HYPERTENSION (HCC): ICD-10-CM

## 2019-11-15 DIAGNOSIS — M81.0 AGE-RELATED OSTEOPOROSIS WITHOUT CURRENT PATHOLOGICAL FRACTURE: ICD-10-CM

## 2019-11-15 PROCEDURE — 99214 OFFICE O/P EST MOD 30 MIN: CPT | Performed by: FAMILY MEDICINE

## 2019-11-15 RX ORDER — ALENDRONATE SODIUM 70 MG/1
TABLET ORAL
Qty: 12 TAB | Refills: 3 | Status: SHIPPED | OUTPATIENT
Start: 2019-11-15 | End: 2020-05-19 | Stop reason: SDUPTHER

## 2019-11-15 NOTE — PROGRESS NOTES
Chief Complaint   Patient presents with   • Hypertension   • COPD     fv        HISTORY OF PRESENT ILLNESS: Patient is a 86 y.o. female established patient here today for the following concerns:    1. Age-related osteoporosis without current pathological fracture  Here today for follow up on osteoporosis.  Continues on alendronate.  Hx of severe osteoporosis.  Did have some dental work but no evidence of osteonecrosis.      2. Chronic respiratory failure with hypoxia (HCC)  3. Chronic obstructive pulmonary disease, unspecified COPD type (HCC)  4. Nocturnal hypoxia  5. Pulmonary hypertension (HCC)  Continues with pulmonology.  On Anoro daily and rescus inhaler as needed as well as the oxygen therapy.  Denies any recent exacerbations. Typically uses oxygen 24/7, but will take a small break while at home and resting on the couch.  Did get flu vaccine.  PNA vaccines are up to date.  Recommend repeat flu in January     6. Mixed hyperlipidemia  Continues on Crestor 10 mg with good tolerability and no adverse effects  No myalgias or CP.     7. White coat syndrome with diagnosis of hypertension  Continues on the lisinopril 20 mg daily.  Reports good tolerability.  Due for repeat labs check renal function.        Past Medical, Social, and Family history reviewed and updated in EPIC    Allergies:Patient has no known allergies.    Current Outpatient Medications   Medication Sig Dispense Refill   • alendronate (FOSAMAX) 70 MG Tab TAKE 1 TABLET EVERY 7 DAYS 12 Tab 3   • ANORO ELLIPTA 62.5-25 MCG/INH AEROSOL POWDER, BREATH ACTIVATED inhaler USE 1 INHALATION DAILY 3 Each 0   • rosuvastatin (CRESTOR) 10 MG Tab TAKE 1 TABLET EVERY EVENING 90 Tab 3   • lisinopril (PRINIVIL) 20 MG Tab Take 1 Tab by mouth every day. 90 Tab 3   • albuterol 108 (90 Base) MCG/ACT Aero Soln inhalation aerosol Inhale 2 Puffs by mouth every 6 hours as needed for Shortness of Breath. 3 Inhaler 3   • Cholecalciferol (VITAMIN D-3) 5000 UNITS Tab Take  by mouth.  "    • aspirin 81 MG tablet Take 81 mg by mouth every day.       No current facility-administered medications for this visit.          ROS:  Review of Systems   Constitutional: Negative for fever, chills, weight loss and malaise/fatigue.   HENT: Negative for ear pain, nosebleeds, congestion, sore throat and neck pain.    Eyes: Negative for blurred vision.   Respiratory: Negative for cough, sputum production, shortness of breath and wheezing.    Cardiovascular: Negative for chest pain, palpitations,  and leg swelling.   Gastrointestinal: Negative for heartburn, nausea, vomiting, diarrhea and abdominal pain.   Genitourinary: Negative for dysuria, urgency and frequency.   Musculoskeletal: Negative for myalgias, back pain and joint pain.   Skin: Negative for rash and itching.   Neurological: Negative for dizziness, tingling, tremors, sensory change, focal weakness and headaches.   Endo/Heme/Allergies: Does not bruise/bleed easily.   Psychiatric/Behavioral: Negative for depression, anxiety, suicidal ideas, insomnia and memory loss.      Exam:  /82 (BP Location: Left arm, Patient Position: Sitting, BP Cuff Size: Adult)   Pulse 96   Temp 37.3 °C (99.1 °F)   Resp 18   Ht 1.626 m (5' 4\")   Wt 53.8 kg (118 lb 9.6 oz)   SpO2 94%     General:  Well nourished, well developed in NAD  Head is grossly normal.  Neck: Supple without JVD   Pulmonary:  Normal effort. Diminished lung sounds bilaterally.   Cardiovascular: Regular rate  Extremities: no clubbing, cyanosis, or edema.  Psych: affect appropriate      Please note that this dictation was created using voice recognition software. I have made every reasonable attempt to correct obvious errors, but I expect that there are errors of grammar and possibly content that I did not discover before finalizing the note.    Assessment/Plan:  1. Age-related osteoporosis without current pathological fracture  - alendronate (FOSAMAX) 70 MG Tab; TAKE 1 TABLET EVERY 7 DAYS  Dispense: " 12 Tab; Refill: 3    2. Chronic respiratory failure with hypoxia (HCC)  3. Chronic obstructive pulmonary disease, unspecified COPD type (HCC)  4. Nocturnal hypoxia  5. Pulmonary hypertension (HCC)  Continue current inhaled medications, O2 therapy 24/7, and obtain 2nd dose of flu in January    6. Mixed hyperlipidemia  Continue crestor    7. White coat syndrome with diagnosis of hypertension  Continue lisinopril    Reminded to have labs done.     Recheck in 3-6 month .

## 2019-11-20 ENCOUNTER — OFFICE VISIT (OUTPATIENT)
Dept: CARDIOLOGY | Facility: MEDICAL CENTER | Age: 84
End: 2019-11-20
Payer: MEDICARE

## 2019-11-20 VITALS
HEIGHT: 64 IN | DIASTOLIC BLOOD PRESSURE: 98 MMHG | HEART RATE: 96 BPM | BODY MASS INDEX: 20.66 KG/M2 | OXYGEN SATURATION: 90 % | SYSTOLIC BLOOD PRESSURE: 190 MMHG | WEIGHT: 121 LBS

## 2019-11-20 DIAGNOSIS — I27.20 PULMONARY HYPERTENSION (HCC): ICD-10-CM

## 2019-11-20 DIAGNOSIS — J96.11 CHRONIC RESPIRATORY FAILURE WITH HYPOXIA (HCC): ICD-10-CM

## 2019-11-20 DIAGNOSIS — J44.9 CHRONIC OBSTRUCTIVE PULMONARY DISEASE, UNSPECIFIED COPD TYPE (HCC): ICD-10-CM

## 2019-11-20 DIAGNOSIS — E78.2 MIXED HYPERLIPIDEMIA: ICD-10-CM

## 2019-11-20 PROCEDURE — 99214 OFFICE O/P EST MOD 30 MIN: CPT | Performed by: INTERNAL MEDICINE

## 2019-11-20 RX ORDER — INFLUENZA A VIRUS A/MICHIGAN/45/2015 X-275 (H1N1) ANTIGEN (FORMALDEHYDE INACTIVATED), INFLUENZA A VIRUS A/SINGAPORE/INFIMH-16-0019/2016 IVR-186 (H3N2) ANTIGEN (FORMALDEHYDE INACTIVATED), AND INFLUENZA B VIRUS B/MARYLAND/15/2016 BX-69A (A B/COLORADO/6/2017-LIKE VIRUS) ANTIGEN (FORMALDEHYDE INACTIVATED) 60; 60; 60 UG/.5ML; UG/.5ML; UG/.5ML
INJECTION, SUSPENSION INTRAMUSCULAR
Refills: 0 | COMMUNITY
Start: 2019-09-22

## 2019-11-20 ASSESSMENT — ENCOUNTER SYMPTOMS
EYES NEGATIVE: 1
NEUROLOGICAL NEGATIVE: 1
MUSCULOSKELETAL NEGATIVE: 1
PND: 0
CLAUDICATION: 0
FEVER: 0
PALPITATIONS: 0
DIZZINESS: 0
GASTROINTESTINAL NEGATIVE: 1
WEAKNESS: 0
STRIDOR: 0
SPUTUM PRODUCTION: 0
LOSS OF CONSCIOUSNESS: 0
SORE THROAT: 0
CHILLS: 0
BRUISES/BLEEDS EASILY: 0
COUGH: 0
SHORTNESS OF BREATH: 0
CARDIOVASCULAR NEGATIVE: 1
HEMOPTYSIS: 0
WHEEZING: 0
CONSTITUTIONAL NEGATIVE: 1
RESPIRATORY NEGATIVE: 1
ORTHOPNEA: 0
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
## 2019-12-14 ENCOUNTER — PATIENT MESSAGE (OUTPATIENT)
Dept: MEDICAL GROUP | Facility: PHYSICIAN GROUP | Age: 84
End: 2019-12-14

## 2019-12-17 NOTE — PATIENT COMMUNICATION
Please see if we can order a second concentrator for her first floor rather than the O2 bottles through her DME.

## 2020-01-14 ENCOUNTER — TELEPHONE (OUTPATIENT)
Dept: CARDIOLOGY | Facility: MEDICAL CENTER | Age: 85
End: 2020-01-14

## 2020-01-14 NOTE — TELEPHONE ENCOUNTER
Called [phone] (home) left voice message reminding patient to bring her BP Log/Cuff to office visit with RO. Also left appointment date, time, and location.

## 2020-02-19 RX ORDER — LISINOPRIL 20 MG/1
TABLET ORAL
Qty: 90 TAB | Refills: 1 | Status: SHIPPED | OUTPATIENT
Start: 2020-02-19 | End: 2020-05-19 | Stop reason: SDUPTHER

## 2020-02-19 NOTE — TELEPHONE ENCOUNTER
Was the patient seen in the last year in this department? Yes    Does patient have an active prescription for medications requested? No     Received Request Via: Pharmacy    Pt met protocol?: Yes     Last OV 11/15/2019    BP Readings from Last 1 Encounters:   11/20/19 (!) 190/98

## 2020-02-20 NOTE — TELEPHONE ENCOUNTER
Refill X 6 months, sent to pharmacy.Pt. Seen in the last 6 months per protocol.   Lab Results   Component Value Date/Time    SODIUM 137 10/18/2018 07:13 AM    POTASSIUM 4.2 10/18/2018 07:13 AM    CHLORIDE 101 10/18/2018 07:13 AM    CO2 28 10/18/2018 07:13 AM    GLUCOSE 104 (H) 10/18/2018 07:13 AM    BUN 19 10/18/2018 07:13 AM    CREATININE 1.08 10/18/2018 07:13 AM    BUNCREATRAT 24 10/12/2016 08:34 AM

## 2020-02-24 ENCOUNTER — OFFICE VISIT (OUTPATIENT)
Dept: CARDIOLOGY | Facility: MEDICAL CENTER | Age: 85
End: 2020-02-24
Payer: MEDICARE

## 2020-02-24 VITALS
DIASTOLIC BLOOD PRESSURE: 68 MMHG | WEIGHT: 120 LBS | HEART RATE: 92 BPM | RESPIRATION RATE: 92 BRPM | SYSTOLIC BLOOD PRESSURE: 154 MMHG | HEIGHT: 64 IN | BODY MASS INDEX: 20.49 KG/M2

## 2020-02-24 DIAGNOSIS — J96.11 CHRONIC RESPIRATORY FAILURE WITH HYPOXIA (HCC): ICD-10-CM

## 2020-02-24 DIAGNOSIS — I27.20 PULMONARY HYPERTENSION (HCC): ICD-10-CM

## 2020-02-24 DIAGNOSIS — J44.9 CHRONIC OBSTRUCTIVE PULMONARY DISEASE, UNSPECIFIED COPD TYPE (HCC): ICD-10-CM

## 2020-02-24 DIAGNOSIS — I10 WHITE COAT SYNDROME WITH DIAGNOSIS OF HYPERTENSION: ICD-10-CM

## 2020-02-24 DIAGNOSIS — E78.2 MIXED HYPERLIPIDEMIA: ICD-10-CM

## 2020-02-24 PROCEDURE — 99214 OFFICE O/P EST MOD 30 MIN: CPT | Performed by: INTERNAL MEDICINE

## 2020-02-24 ASSESSMENT — ENCOUNTER SYMPTOMS
BRUISES/BLEEDS EASILY: 0
PND: 0
STRIDOR: 0
WEAKNESS: 0
MUSCULOSKELETAL NEGATIVE: 1
NEUROLOGICAL NEGATIVE: 1
CHILLS: 0
CONSTITUTIONAL NEGATIVE: 1
RESPIRATORY NEGATIVE: 1
CLAUDICATION: 0
COUGH: 0
GASTROINTESTINAL NEGATIVE: 1
SHORTNESS OF BREATH: 0
WHEEZING: 0
LOSS OF CONSCIOUSNESS: 0
SORE THROAT: 0
SPUTUM PRODUCTION: 0
ORTHOPNEA: 0
CARDIOVASCULAR NEGATIVE: 1
DIZZINESS: 0
HEMOPTYSIS: 0
EYES NEGATIVE: 1
FEVER: 0
PALPITATIONS: 0

## 2020-02-24 NOTE — PROGRESS NOTES
Chief Complaint   Patient presents with   • Pulmonary Hypertension       Subjective:   Nieves Busch is a 87 y.o. female who presents today as a follow-up for cor pulmonale hypertension hyperlipidemia and pulmonary hypertension.  She continues on her oxygen.  She is on 2 L.  She has no lower extremity edema.  She does get white coat hypertension today.  I rechecked her blood pressure which is 130/90.  Her list of blood pressures are all about 1 20-1 30 at home.  She is otherwise had no changes to her clinical status and is doing well.    Past Medical History:   Diagnosis Date   • Essential hypertension 9/25/2015     Past Surgical History:   Procedure Laterality Date   • CATARACT PHACO WITH IOL  8/20/2013    Performed by Wagner Gomez M.D. at [facility]   • CATARACT PHACO WITH IOL  8/6/2013    Performed by Wagner Gomez M.D. at [facility]     Family History   Problem Relation Age of Onset   • Cancer Daughter    • Cancer Father         Lungs   • Other Father         Tobbacco   • Alcohol/Drug Mother      Social History     Socioeconomic History   • Marital status:      Spouse name: Not on file   • Number of children: Not on file   • Years of education: Not on file   • Highest education level: Not on file   Occupational History   • Not on file   Social Needs   • Financial resource strain: Not on file   • Food insecurity     Worry: Not on file     Inability: Not on file   • Transportation needs     Medical: Not on file     Non-medical: Not on file   Tobacco Use   • Smoking status: Never Smoker   • Smokeless tobacco: Never Used   • Tobacco comment: significant 2nd hand smoke exposure.    Substance and Sexual Activity   • Alcohol use: No     Alcohol/week: 0.0 oz   • Drug use: No   • Sexual activity: Not Currently     Partners: Male     Comment:     Lifestyle   • Physical activity     Days per week: Not on file     Minutes per session: Not on file   • Stress: Not on  file   Relationships   • Social connections     Talks on phone: Not on file     Gets together: Not on file     Attends Gnosticist service: Not on file     Active member of club or organization: Not on file     Attends meetings of clubs or organizations: Not on file     Relationship status: Not on file   • Intimate partner violence     Fear of current or ex partner: Not on file     Emotionally abused: Not on file     Physically abused: Not on file     Forced sexual activity: Not on file   Other Topics Concern   • Not on file   Social History Narrative   • Not on file     No Known Allergies  Outpatient Encounter Medications as of 2/24/2020   Medication Sig Dispense Refill   • lisinopril (PRINIVIL) 20 MG Tab TAKE 1 TABLET DAILY 90 Tab 1   • rosuvastatin (CRESTOR) 10 MG Tab TAKE 1 TABLET EVERY EVENING 90 Tab 0   • alendronate (FOSAMAX) 70 MG Tab TAKE 1 TABLET EVERY 7 DAYS 12 Tab 3   • ANORO ELLIPTA 62.5-25 MCG/INH AEROSOL POWDER, BREATH ACTIVATED inhaler USE 1 INHALATION DAILY 3 Each 0   • albuterol 108 (90 Base) MCG/ACT Aero Soln inhalation aerosol Inhale 2 Puffs by mouth every 6 hours as needed for Shortness of Breath. 3 Inhaler 3   • Cholecalciferol (VITAMIN D-3) 5000 UNITS Tab Take  by mouth.     • aspirin 81 MG tablet Take 81 mg by mouth every day.     • FLUZONE HIGH-DOSE 0.5 ML Suspension Prefilled Syringe injection TO BE ADMINISTERED BY PHARMACIST FOR IMMUNIZATION  0     No facility-administered encounter medications on file as of 2/24/2020.      Review of Systems   Constitutional: Negative.  Negative for chills, fever and malaise/fatigue.   HENT: Negative.  Negative for sore throat.    Eyes: Negative.    Respiratory: Negative.  Negative for cough, hemoptysis, sputum production, shortness of breath, wheezing and stridor.    Cardiovascular: Negative.  Negative for chest pain, palpitations, orthopnea, claudication, leg swelling and PND.   Gastrointestinal: Negative.    Genitourinary: Negative.    Musculoskeletal:  "Negative.    Skin: Negative.    Neurological: Negative.  Negative for dizziness, loss of consciousness and weakness.   Endo/Heme/Allergies: Negative.  Does not bruise/bleed easily.   All other systems reviewed and are negative.       Objective:   /68 (BP Location: Left arm, Patient Position: Sitting, BP Cuff Size: Adult)   Pulse 92   Resp (!) 92   Ht 1.626 m (5' 4\")   Wt 54.4 kg (120 lb)   BMI 20.60 kg/m²     Physical Exam   Constitutional: She appears well-developed and well-nourished. No distress.   HENT:   Head: Normocephalic and atraumatic.   Right Ear: External ear normal.   Left Ear: External ear normal.   Nose: Nose normal.   Mouth/Throat: No oropharyngeal exudate.   Eyes: Pupils are equal, round, and reactive to light. Conjunctivae and EOM are normal. Right eye exhibits no discharge. Left eye exhibits no discharge. No scleral icterus.   Neck: Neck supple. No JVD present.   Cardiovascular: Normal rate, regular rhythm and intact distal pulses. Exam reveals no gallop and no friction rub.   No murmur heard.  Pulmonary/Chest: Effort normal. No stridor. No respiratory distress. She has no wheezes. She has no rales. She exhibits no tenderness.   Abdominal: Soft. She exhibits no distension. There is no guarding.   Musculoskeletal: Normal range of motion.         General: No tenderness, deformity or edema.   Neurological: She is alert. She has normal reflexes. She displays normal reflexes. No cranial nerve deficit. She exhibits normal muscle tone. Coordination normal.   Skin: Skin is warm and dry. No rash noted. She is not diaphoretic. No erythema. No pallor.   Psychiatric: She has a normal mood and affect. Her behavior is normal. Judgment and thought content normal.   Nursing note and vitals reviewed.      Assessment:     1. Chronic respiratory failure with hypoxia (HCC)     2. Chronic obstructive pulmonary disease, unspecified COPD type (HCC)     3. Mixed hyperlipidemia     4. Pulmonary hypertension " ([facility])     5. White coat syndrome with diagnosis of hypertension         Medical Decision Making:  Today's Assessment / Status / Plan:     87-year-old female with COPD hypertension hyperlipidemia and pulmonary pretension.  Her risk factors are well controlled.  She is doing well.  I have offered her refills of her medications but she has them on automatic refill.  We can see her back in 6 months.    COPD (chronic obstructive pulmonary disease)  Cont O2     Hyperlipidemia  Cont rosuva to 10     Nocturnal hypoxia  Cont o2     Pulmonary hypertension  Cont O2  Cont inhalers  F/U Pulm

## 2020-03-11 DIAGNOSIS — E78.2 MIXED HYPERLIPIDEMIA: ICD-10-CM

## 2020-03-12 RX ORDER — ROSUVASTATIN CALCIUM 10 MG/1
TABLET, COATED ORAL
Qty: 30 TAB | Refills: 0 | Status: SHIPPED | OUTPATIENT
Start: 2020-03-12 | End: 2020-05-19 | Stop reason: SDUPTHER

## 2020-03-12 NOTE — TELEPHONE ENCOUNTER
Last seen by PCP 11/15/2019.  On refill request from December, patient told to get labs done.   Most recent labs from 10/18/2018.    Will send 1 month(s) to the pharmacy.  Pt to make appt and get labs prior to more refills, next will be denied per protocol.

## 2020-03-12 NOTE — TELEPHONE ENCOUNTER
Was the patient seen in the last year in this department? Yes    Does patient have an active prescription for medications requested? No     Received Request Via: Pharmacy      Pt met protocol?: Yes, last ov 11/19  No recent labs   Lab Results   Component Value Date/Time    CHOLSTRLTOT 201 (H) 10/18/2018 0713    TRIGLYCERIDE 83 10/18/2018 0713     10/18/2018 0713    LDL 84 10/18/2018 0713

## 2020-03-13 ENCOUNTER — PATIENT MESSAGE (OUTPATIENT)
Dept: MEDICAL GROUP | Facility: PHYSICIAN GROUP | Age: 85
End: 2020-03-13

## 2020-03-31 NOTE — TELEPHONE ENCOUNTER
Was the patient seen in the last year in this department? Yes    Does patient have an active prescription for medications requested? No     Received Request Via: Pharmacy      Pt met protocol?: Yes    LAST OV 11/15/2019

## 2020-05-04 ENCOUNTER — HOSPITAL ENCOUNTER (OUTPATIENT)
Dept: LAB | Facility: MEDICAL CENTER | Age: 85
End: 2020-05-04
Attending: FAMILY MEDICINE
Payer: MEDICARE

## 2020-05-04 DIAGNOSIS — E78.2 MIXED HYPERLIPIDEMIA: ICD-10-CM

## 2020-05-04 LAB
ALBUMIN SERPL BCP-MCNC: 4.4 G/DL (ref 3.2–4.9)
ALBUMIN/GLOB SERPL: 1.4 G/DL
ALP SERPL-CCNC: 64 U/L (ref 30–99)
ALT SERPL-CCNC: 27 U/L (ref 2–50)
ANION GAP SERPL CALC-SCNC: 13 MMOL/L (ref 7–16)
AST SERPL-CCNC: 29 U/L (ref 12–45)
BILIRUB SERPL-MCNC: 0.6 MG/DL (ref 0.1–1.5)
BUN SERPL-MCNC: 27 MG/DL (ref 8–22)
CALCIUM SERPL-MCNC: 9.7 MG/DL (ref 8.5–10.5)
CHLORIDE SERPL-SCNC: 96 MMOL/L (ref 96–112)
CHOLEST SERPL-MCNC: 216 MG/DL (ref 100–199)
CO2 SERPL-SCNC: 28 MMOL/L (ref 20–33)
CREAT SERPL-MCNC: 0.95 MG/DL (ref 0.5–1.4)
FASTING STATUS PATIENT QL REPORTED: NORMAL
GLOBULIN SER CALC-MCNC: 3.1 G/DL (ref 1.9–3.5)
GLUCOSE SERPL-MCNC: 128 MG/DL (ref 65–99)
HDLC SERPL-MCNC: 109 MG/DL
LDLC SERPL CALC-MCNC: 91 MG/DL
POTASSIUM SERPL-SCNC: 4.4 MMOL/L (ref 3.6–5.5)
PROT SERPL-MCNC: 7.5 G/DL (ref 6–8.2)
SODIUM SERPL-SCNC: 137 MMOL/L (ref 135–145)
TRIGL SERPL-MCNC: 78 MG/DL (ref 0–149)

## 2020-05-04 PROCEDURE — 80061 LIPID PANEL: CPT

## 2020-05-04 PROCEDURE — 80053 COMPREHEN METABOLIC PANEL: CPT

## 2020-05-04 PROCEDURE — 36415 COLL VENOUS BLD VENIPUNCTURE: CPT

## 2020-08-19 ENCOUNTER — PATIENT MESSAGE (OUTPATIENT)
Dept: CARDIOLOGY | Facility: MEDICAL CENTER | Age: 85
End: 2020-08-19

## 2020-08-19 NOTE — PATIENT COMMUNICATION
Called patient to inform we have changed appointment to a  Virtual appointment: same time and same date. Informed patient we will call her in advance to check her in, patient's daughter: Dixie will be there to assist her. Patient was greatful for call.

## 2020-08-19 NOTE — TELEPHONE ENCOUNTER
----- Message from Nieves Busch sent at 8/19/2020  9:47 AM PDT -----  Regarding: Non-Urgent Medical Question  Contact: [phone]  Can i please change my 8/31 appointment to a virtual visit?    Nieves

## 2020-08-31 ENCOUNTER — TELEMEDICINE (OUTPATIENT)
Dept: CARDIOLOGY | Facility: MEDICAL CENTER | Age: 85
End: 2020-08-31
Payer: MEDICARE

## 2020-08-31 VITALS
HEART RATE: 83 BPM | BODY MASS INDEX: 20.49 KG/M2 | OXYGEN SATURATION: 92 % | WEIGHT: 120 LBS | HEIGHT: 64 IN | SYSTOLIC BLOOD PRESSURE: 131 MMHG | DIASTOLIC BLOOD PRESSURE: 59 MMHG

## 2020-08-31 DIAGNOSIS — I27.20 PULMONARY HYPERTENSION (HCC): ICD-10-CM

## 2020-08-31 DIAGNOSIS — E78.2 MIXED HYPERLIPIDEMIA: ICD-10-CM

## 2020-08-31 DIAGNOSIS — I10 ESSENTIAL HYPERTENSION: ICD-10-CM

## 2020-08-31 DIAGNOSIS — J44.9 CHRONIC OBSTRUCTIVE PULMONARY DISEASE, UNSPECIFIED COPD TYPE (HCC): ICD-10-CM

## 2020-08-31 PROCEDURE — 99214 OFFICE O/P EST MOD 30 MIN: CPT | Mod: 95,CR | Performed by: INTERNAL MEDICINE

## 2020-08-31 RX ORDER — ROSUVASTATIN CALCIUM 10 MG/1
TABLET, COATED ORAL
Qty: 90 TAB | Refills: 3 | Status: SHIPPED | OUTPATIENT
Start: 2020-08-31 | End: 2021-03-08 | Stop reason: SDUPTHER

## 2020-08-31 RX ORDER — LISINOPRIL 20 MG/1
TABLET ORAL
Qty: 90 TAB | Refills: 3 | Status: SHIPPED | OUTPATIENT
Start: 2020-08-31 | End: 2021-03-08 | Stop reason: SDUPTHER

## 2020-08-31 ASSESSMENT — ENCOUNTER SYMPTOMS
CARDIOVASCULAR NEGATIVE: 1
SORE THROAT: 0
CHILLS: 0
HEMOPTYSIS: 0
CLAUDICATION: 0
CONSTITUTIONAL NEGATIVE: 1
COUGH: 0
EYES NEGATIVE: 1
PALPITATIONS: 0
SPUTUM PRODUCTION: 0
LOSS OF CONSCIOUSNESS: 0
ORTHOPNEA: 0
WHEEZING: 0
STRIDOR: 0
GASTROINTESTINAL NEGATIVE: 1
WEAKNESS: 0
NEUROLOGICAL NEGATIVE: 1
BRUISES/BLEEDS EASILY: 0
MUSCULOSKELETAL NEGATIVE: 1
DIZZINESS: 0
PND: 0
SHORTNESS OF BREATH: 0
FEVER: 0
RESPIRATORY NEGATIVE: 1

## 2020-08-31 ASSESSMENT — FIBROSIS 4 INDEX: FIB4 SCORE: 2.02

## 2020-08-31 NOTE — PROGRESS NOTES
Chief Complaint   Patient presents with   • Hyperlipidemia     virtual visit       Subjective:   Nieves Busch is a 87 y.o. female who presents today as a follow-up for her cor pulmonale COPD hypertension hyperlipidemia.  She was last seen she continues to do well.  She is on 2 L oxygen.  She is not been going out during the coronavirus.  She has no chest pain palpitations or PND.    Past Medical History:   Diagnosis Date   • Essential hypertension 9/25/2015     Past Surgical History:   Procedure Laterality Date   • CATARACT PHACO WITH IOL  8/20/2013    Performed by Wagner Gomez M.D. at [facility]   • CATARACT PHACO WITH IOL  8/6/2013    Performed by Wagner Gomez M.D. at [facility]     Family History   Problem Relation Age of Onset   • Cancer Daughter    • Cancer Father         Lungs   • Other Father         Tobbacco   • Alcohol/Drug Mother      Social History     Socioeconomic History   • Marital status:      Spouse name: Not on file   • Number of children: Not on file   • Years of education: Not on file   • Highest education level: Not on file   Occupational History   • Not on file   Social Needs   • Financial resource strain: Not on file   • Food insecurity     Worry: Not on file     Inability: Not on file   • Transportation needs     Medical: Not on file     Non-medical: Not on file   Tobacco Use   • Smoking status: Never Smoker   • Smokeless tobacco: Never Used   • Tobacco comment: significant 2nd hand smoke exposure.    Substance and Sexual Activity   • Alcohol use: No     Alcohol/week: 0.0 oz   • Drug use: No   • Sexual activity: Not Currently     Partners: Male     Comment:     Lifestyle   • Physical activity     Days per week: Not on file     Minutes per session: Not on file   • Stress: Not on file   Relationships   • Social connections     Talks on phone: Not on file     Gets together: Not on file     Attends Hinduism service: Not on file      Active member of club or organization: Not on file     Attends meetings of clubs or organizations: Not on file     Relationship status: Not on file   • Intimate partner violence     Fear of current or ex partner: Not on file     Emotionally abused: Not on file     Physically abused: Not on file     Forced sexual activity: Not on file   Other Topics Concern   • Not on file   Social History Narrative   • Not on file     No Known Allergies  Outpatient Encounter Medications as of 8/31/2020   Medication Sig Dispense Refill   • lisinopril (PRINIVIL) 20 MG Tab TAKE 1 TABLET DAILY 90 Tab 3   • rosuvastatin (CRESTOR) 10 MG Tab TAKE 1 TABLET EVERY EVENING (GET LABS DONE) 90 Tab 3   • alendronate (FOSAMAX) 70 MG Tab TAKE 1 TABLET EVERY 7 DAYS 12 Tab 3   • ANORO ELLIPTA 62.5-25 MCG/INH AEROSOL POWDER, BREATH ACTIVATED inhaler USE 1 INHALATION DAILY 3 Each 3   • albuterol 108 (90 Base) MCG/ACT Aero Soln inhalation aerosol Inhale 2 Puffs by mouth every 6 hours as needed for Shortness of Breath. 3 Inhaler 3   • Cholecalciferol (VITAMIN D-3) 5000 UNITS Tab Take  by mouth.     • aspirin 81 MG tablet Take 81 mg by mouth every day.     • [DISCONTINUED] rosuvastatin (CRESTOR) 10 MG Tab TAKE 1 TABLET EVERY EVENING (GET LABS DONE) 90 Tab 3   • [DISCONTINUED] lisinopril (PRINIVIL) 20 MG Tab TAKE 1 TABLET DAILY 90 Tab 3   • FLUZONE HIGH-DOSE 0.5 ML Suspension Prefilled Syringe injection TO BE ADMINISTERED BY PHARMACIST FOR IMMUNIZATION  0     No facility-administered encounter medications on file as of 8/31/2020.      Review of Systems   Constitutional: Negative.  Negative for chills, fever and malaise/fatigue.   HENT: Negative.  Negative for sore throat.    Eyes: Negative.    Respiratory: Negative.  Negative for cough, hemoptysis, sputum production, shortness of breath, wheezing and stridor.    Cardiovascular: Negative.  Negative for chest pain, palpitations, orthopnea, claudication, leg swelling and PND.   Gastrointestinal: Negative.   "  Genitourinary: Negative.    Musculoskeletal: Negative.    Skin: Negative.    Neurological: Negative.  Negative for dizziness, loss of consciousness and weakness.   Endo/Heme/Allergies: Negative.  Does not bruise/bleed easily.   All other systems reviewed and are negative.       Objective:   /59 (BP Location: Left arm, Patient Position: Sitting)   Pulse 83   Ht 1.626 m (5' 4\")   Wt 54.4 kg (120 lb)   SpO2 92%   BMI 20.60 kg/m²     Physical Exam   Constitutional: She appears well-developed and well-nourished. No distress.   HENT:   Head: Normocephalic and atraumatic.   Right Ear: External ear normal.   Left Ear: External ear normal.   Nose: Nose normal.   Mouth/Throat: No oropharyngeal exudate.   Eyes: Pupils are equal, round, and reactive to light. Conjunctivae and EOM are normal. Right eye exhibits no discharge. Left eye exhibits no discharge. No scleral icterus.   Neck: Neck supple. No JVD present.   Cardiovascular: Normal rate, regular rhythm and intact distal pulses. Exam reveals no gallop and no friction rub.   No murmur heard.  Pulmonary/Chest: Effort normal. No stridor. No respiratory distress. She has no wheezes. She has no rales. She exhibits no tenderness.   Abdominal: Soft. She exhibits no distension. There is no guarding.   Musculoskeletal: Normal range of motion.         General: No tenderness, deformity or edema.   Neurological: She is alert. She has normal reflexes. She displays normal reflexes. No cranial nerve deficit. She exhibits normal muscle tone. Coordination normal.   Skin: Skin is warm and dry. No rash noted. She is not diaphoretic. No erythema. No pallor.   Psychiatric: She has a normal mood and affect. Her behavior is normal. Judgment and thought content normal.   Nursing note and vitals reviewed.      Assessment:     1. Chronic obstructive pulmonary disease, unspecified COPD type (HCC)     2. Mixed hyperlipidemia  rosuvastatin (CRESTOR) 10 MG Tab   3. Pulmonary hypertension " (HCC)     4. Essential hypertension  lisinopril (PRINIVIL) 20 MG Tab       Medical Decision Making:  Today's Assessment / Status / Plan:   87-year-old female with cor pulmonale hypertension hyperlipidemia and high risk medication usage.  I refilled her medications.  She is doing well at this point.  I have no concerns we will see her back in 6 months.    COPD (chronic obstructive pulmonary disease)  Cont O2     Hyperlipidemia  Cont rosuva to 10     Nocturnal hypoxia  Cont o2     Pulmonary hypertension  Cont O2  Cont inhalers  F/U Pulm    Start time: 10:15  Stop time: 10:30    This evaluation was conducted via Zoom using secure and encrypted videoconferencing technology. The patient [place] [facility]    The patient's identity was confirmed and verbal consent was obtained for this virtual visit.

## 2020-09-15 RX ORDER — ALBUTEROL SULFATE 90 UG/1
2 AEROSOL, METERED RESPIRATORY (INHALATION) EVERY 6 HOURS PRN
Qty: 3 EACH | Refills: 3 | Status: SHIPPED | OUTPATIENT
Start: 2020-09-15

## 2020-09-24 ENCOUNTER — PATIENT MESSAGE (OUTPATIENT)
Dept: MEDICAL GROUP | Facility: PHYSICIAN GROUP | Age: 85
End: 2020-09-24

## 2020-10-02 ENCOUNTER — NON-PROVIDER VISIT (OUTPATIENT)
Dept: MEDICAL GROUP | Facility: PHYSICIAN GROUP | Age: 85
End: 2020-10-02
Payer: MEDICARE

## 2020-10-02 DIAGNOSIS — Z23 NEED FOR VACCINATION: ICD-10-CM

## 2020-10-02 PROCEDURE — G0008 ADMIN INFLUENZA VIRUS VAC: HCPCS | Performed by: FAMILY MEDICINE

## 2020-10-02 PROCEDURE — 90662 IIV NO PRSV INCREASED AG IM: CPT | Performed by: FAMILY MEDICINE

## 2020-10-02 NOTE — PROGRESS NOTES
"Nieves Busch is a 87 y.o. female here for a non-provider visit for:   FLU    Reason for immunization: Annual Flu Vaccine  Immunization records indicate need for vaccine: Yes, confirmed with Epic and confirmed with [place] [employer]  Minimum interval has been met for this vaccine: Yes  ABN completed: Not Indicated    Order and dose verified by: MT  VIS Dated  08/15/19 was given to patient: Yes  All IAC Questionnaire questions were answered \"No.\"    Patient tolerated injection and no adverse effects were observed or reported: Yes    Pt scheduled for next dose in series: No    "

## 2021-01-11 DIAGNOSIS — Z23 NEED FOR VACCINATION: ICD-10-CM

## 2021-02-17 ENCOUNTER — IMMUNIZATION (OUTPATIENT)
Dept: FAMILY PLANNING/WOMEN'S HEALTH CLINIC | Facility: IMMUNIZATION CENTER | Age: 86
End: 2021-02-17
Attending: INTERNAL MEDICINE
Payer: MEDICARE

## 2021-02-17 DIAGNOSIS — Z23 ENCOUNTER FOR VACCINATION: Primary | ICD-10-CM

## 2021-02-17 DIAGNOSIS — Z23 NEED FOR VACCINATION: ICD-10-CM

## 2021-02-17 PROCEDURE — 91300 PFIZER SARS-COV-2 VACCINE: CPT

## 2021-02-17 PROCEDURE — 0001A PFIZER SARS-COV-2 VACCINE: CPT

## 2021-03-08 ENCOUNTER — TELEMEDICINE (OUTPATIENT)
Dept: CARDIOLOGY | Facility: MEDICAL CENTER | Age: 86
End: 2021-03-08
Payer: MEDICARE

## 2021-03-08 VITALS
BODY MASS INDEX: 19.74 KG/M2 | WEIGHT: 115 LBS | HEART RATE: 84 BPM | DIASTOLIC BLOOD PRESSURE: 61 MMHG | SYSTOLIC BLOOD PRESSURE: 134 MMHG

## 2021-03-08 DIAGNOSIS — G47.34 NOCTURNAL HYPOXIA: ICD-10-CM

## 2021-03-08 DIAGNOSIS — J96.11 CHRONIC RESPIRATORY FAILURE WITH HYPOXIA (HCC): ICD-10-CM

## 2021-03-08 DIAGNOSIS — I10 ESSENTIAL HYPERTENSION: ICD-10-CM

## 2021-03-08 DIAGNOSIS — I27.20 PULMONARY HYPERTENSION (HCC): ICD-10-CM

## 2021-03-08 DIAGNOSIS — E78.2 MIXED HYPERLIPIDEMIA: ICD-10-CM

## 2021-03-08 DIAGNOSIS — J44.9 CHRONIC OBSTRUCTIVE PULMONARY DISEASE, UNSPECIFIED COPD TYPE (HCC): ICD-10-CM

## 2021-03-08 PROCEDURE — 99214 OFFICE O/P EST MOD 30 MIN: CPT | Mod: 95,CR | Performed by: INTERNAL MEDICINE

## 2021-03-08 RX ORDER — ROSUVASTATIN CALCIUM 10 MG/1
TABLET, COATED ORAL
Qty: 90 TABLET | Refills: 3 | Status: SHIPPED | OUTPATIENT
Start: 2021-03-08 | End: 2021-11-05 | Stop reason: SDUPTHER

## 2021-03-08 RX ORDER — LISINOPRIL 20 MG/1
TABLET ORAL
Qty: 90 TABLET | Refills: 3 | Status: SHIPPED | OUTPATIENT
Start: 2021-03-08 | End: 2021-11-05 | Stop reason: SDUPTHER

## 2021-03-08 ASSESSMENT — ENCOUNTER SYMPTOMS
CONSTITUTIONAL NEGATIVE: 1
GASTROINTESTINAL NEGATIVE: 1
SHORTNESS OF BREATH: 0
SORE THROAT: 0
PND: 0
SPUTUM PRODUCTION: 0
BRUISES/BLEEDS EASILY: 0
COUGH: 0
WEAKNESS: 0
EYES NEGATIVE: 1
NEUROLOGICAL NEGATIVE: 1
WHEEZING: 0
FEVER: 0
HEMOPTYSIS: 0
CHILLS: 0
MUSCULOSKELETAL NEGATIVE: 1
PALPITATIONS: 0
DIZZINESS: 0
ORTHOPNEA: 0
LOSS OF CONSCIOUSNESS: 0
CARDIOVASCULAR NEGATIVE: 1
STRIDOR: 0
RESPIRATORY NEGATIVE: 1
CLAUDICATION: 0

## 2021-03-08 NOTE — PROGRESS NOTES
Chief Complaint   Patient presents with   • Pulmonary Hypertension       Subjective:   Nieves Busch is a 88 y.o. female who presents today as a follow-up for a cavity hypertension hyperlipidemia and COPD.  Since she was last seen she continues to do well.  She is getting vaccinated.  Her blood pressures been controlled.  She is no lower extremity edema.  Is been since May since we last did blood work.    Past Medical History:   Diagnosis Date   • Essential hypertension 9/25/2015     Past Surgical History:   Procedure Laterality Date   • CATARACT PHACO WITH IOL  8/20/2013    Performed by Wagner Gomez M.D. at [facility]   • CATARACT PHACO WITH IOL  8/6/2013    Performed by Wagner Gomez M.D. at [facility]     Family History   Problem Relation Age of Onset   • Cancer Daughter    • Cancer Father         Lungs   • Other Father         Tobbacco   • Alcohol/Drug Mother      Social History     Socioeconomic History   • Marital status:      Spouse name: Not on file   • Number of children: Not on file   • Years of education: Not on file   • Highest education level: Not on file   Occupational History   • Not on file   Tobacco Use   • Smoking status: Never Smoker   • Smokeless tobacco: Never Used   • Tobacco comment: significant 2nd hand smoke exposure.    Substance and Sexual Activity   • Alcohol use: No     Alcohol/week: 0.0 oz   • Drug use: No   • Sexual activity: Not Currently     Partners: Male     Comment:     Other Topics Concern   • Not on file   Social History Narrative   • Not on file     Social Determinants of Health     Financial Resource Strain:    • Difficulty of Paying Living Expenses:    Food Insecurity:    • Worried About Running Out of Food in the Last Year:    • Ran Out of Food in the Last Year:    Transportation Needs:    • Lack of Transportation (Medical):    • Lack of Transportation (Non-Medical):    Physical Activity:    • Days of Exercise per  Week:    • Minutes of Exercise per Session:    Stress:    • Feeling of Stress :    Social Connections:    • Frequency of Communication with Friends and Family:    • Frequency of Social Gatherings with Friends and Family:    • Attends Mosque Services:    • Active Member of Clubs or Organizations:    • Attends Club or Organization Meetings:    • Marital Status:    Intimate Partner Violence:    • Fear of Current or Ex-Partner:    • Emotionally Abused:    • Physically Abused:    • Sexually Abused:      No Known Allergies  Outpatient Encounter Medications as of 3/8/2021   Medication Sig Dispense Refill   • albuterol 108 (90 Base) MCG/ACT Aero Soln inhalation aerosol Inhale 2 Puffs by mouth every 6 hours as needed for Shortness of Breath. 3 Each 3   • lisinopril (PRINIVIL) 20 MG Tab TAKE 1 TABLET DAILY 90 Tab 3   • rosuvastatin (CRESTOR) 10 MG Tab TAKE 1 TABLET EVERY EVENING (GET LABS DONE) 90 Tab 3   • alendronate (FOSAMAX) 70 MG Tab TAKE 1 TABLET EVERY 7 DAYS 12 Tab 3   • ANORO ELLIPTA 62.5-25 MCG/INH AEROSOL POWDER, BREATH ACTIVATED inhaler USE 1 INHALATION DAILY 3 Each 3   • Cholecalciferol (VITAMIN D-3) 5000 UNITS Tab Take  by mouth.     • aspirin 81 MG tablet Take 81 mg by mouth every day.     • FLUZONE HIGH-DOSE 0.5 ML Suspension Prefilled Syringe injection TO BE ADMINISTERED BY PHARMACIST FOR IMMUNIZATION  0     No facility-administered encounter medications on file as of 3/8/2021.     Review of Systems   Constitutional: Negative.  Negative for chills, fever and malaise/fatigue.   HENT: Negative.  Negative for sore throat.    Eyes: Negative.    Respiratory: Negative.  Negative for cough, hemoptysis, sputum production, shortness of breath, wheezing and stridor.    Cardiovascular: Negative.  Negative for chest pain, palpitations, orthopnea, claudication, leg swelling and PND.   Gastrointestinal: Negative.    Genitourinary: Negative.    Musculoskeletal: Negative.    Skin: Negative.    Neurological: Negative.   Negative for dizziness, loss of consciousness and weakness.   Endo/Heme/Allergies: Negative.  Does not bruise/bleed easily.   All other systems reviewed and are negative.       Objective:   /61 (BP Location: Left arm, Patient Position: Sitting, BP Cuff Size: Adult)   Pulse 84   Wt 52.2 kg (115 lb)   BMI 19.74 kg/m²     Physical Exam   Constitutional: She appears well-developed and well-nourished. No distress.   HENT:   Head: Normocephalic and atraumatic.   Right Ear: External ear normal.   Left Ear: External ear normal.   Nose: Nose normal.   Mouth/Throat: No oropharyngeal exudate.   Eyes: Pupils are equal, round, and reactive to light. Conjunctivae and EOM are normal. Right eye exhibits no discharge. Left eye exhibits no discharge. No scleral icterus.   Neck: No JVD present.   Cardiovascular: Normal rate, regular rhythm and intact distal pulses. Exam reveals no gallop and no friction rub.   No murmur heard.  Pulmonary/Chest: Effort normal. No stridor. No respiratory distress. She has no wheezes. She has no rales. She exhibits no tenderness.   Abdominal: Soft. She exhibits no distension. There is no guarding.   Musculoskeletal:         General: No tenderness, deformity or edema. Normal range of motion.      Cervical back: Neck supple.   Neurological: She is alert. She has normal reflexes. She displays normal reflexes. No cranial nerve deficit. She exhibits normal muscle tone. Coordination normal.   Skin: Skin is warm and dry. No rash noted. She is not diaphoretic. No erythema. No pallor.   Psychiatric: She has a normal mood and affect. Her behavior is normal. Judgment and thought content normal.   Nursing note and vitals reviewed.    Echocardiogram: Dated 12/27/2017 personally viewed interpreted myself showing EF of 60% no valvular heart disease mildly elevated RV systolic pressure 40.      Blood work as below and reviewed with the patient.  Lab Results   Component Value Date/Time    CHOLSTRLTOT 216 (H)  05/04/2020 06:21 AM    LDL 91 05/04/2020 06:21 AM     05/04/2020 06:21 AM    TRIGLYCERIDE 78 05/04/2020 06:21 AM       Lab Results   Component Value Date/Time    SODIUM 137 05/04/2020 06:21 AM    POTASSIUM 4.4 05/04/2020 06:21 AM    CHLORIDE 96 05/04/2020 06:21 AM    CO2 28 05/04/2020 06:21 AM    GLUCOSE 128 (H) 05/04/2020 06:21 AM    BUN 27 (H) 05/04/2020 06:21 AM    CREATININE 0.95 05/04/2020 06:21 AM    BUNCREATRAT 24 10/12/2016 08:34 AM     Lab Results   Component Value Date/Time    ALKPHOSPHAT 64 05/04/2020 06:21 AM    ASTSGOT 29 05/04/2020 06:21 AM    ALTSGPT 27 05/04/2020 06:21 AM    TBILIRUBIN 0.6 05/04/2020 06:21 AM        Assessment:     1. Chronic obstructive pulmonary disease, unspecified COPD type (HCC)     2. Mixed hyperlipidemia     3. Pulmonary hypertension (HCC)     4. Nocturnal hypoxia     5. Chronic respiratory failure with hypoxia (HCC)         Medical Decision Making:  Today's Assessment / Status / Plan:     88-year-old female with cor pulmonale hypertension hyperlipidemia and high risk medication usage.  I reviewed her labs from May which look appropriate.  We will recheck a set of labs.  I refilled her medications for the next year.  She is otherwise doing well.  I will see her back in 6 months.      COPD (chronic obstructive pulmonary disease)  Cont O2     Hyperlipidemia  Cont rosuva to 10     Nocturnal hypoxia  Cont o2     Pulmonary hypertension  Cont O2  Cont inhalers  F/U Pulm  Start time: 7:55  Stop time: 8:10    This evaluation was conducted via Zoom using secure and encrypted videoconferencing technology. The patient was in a private location in the [facility].    The patient's identity was confirmed and verbal consent was obtained for this virtual visit.

## 2021-03-10 ENCOUNTER — IMMUNIZATION (OUTPATIENT)
Dept: FAMILY PLANNING/WOMEN'S HEALTH CLINIC | Facility: IMMUNIZATION CENTER | Age: 86
End: 2021-03-10
Attending: INTERNAL MEDICINE
Payer: MEDICARE

## 2021-03-10 DIAGNOSIS — Z23 ENCOUNTER FOR VACCINATION: Primary | ICD-10-CM

## 2021-03-10 PROCEDURE — 91300 PFIZER SARS-COV-2 VACCINE: CPT | Performed by: NURSE PRACTITIONER

## 2021-03-10 PROCEDURE — 0002A PFIZER SARS-COV-2 VACCINE: CPT | Performed by: NURSE PRACTITIONER

## 2021-08-31 ENCOUNTER — HOSPITAL ENCOUNTER (OUTPATIENT)
Dept: LAB | Facility: MEDICAL CENTER | Age: 86
End: 2021-08-31
Attending: INTERNAL MEDICINE
Payer: MEDICARE

## 2021-08-31 DIAGNOSIS — J44.9 CHRONIC OBSTRUCTIVE PULMONARY DISEASE, UNSPECIFIED COPD TYPE (HCC): ICD-10-CM

## 2021-08-31 DIAGNOSIS — J96.11 CHRONIC RESPIRATORY FAILURE WITH HYPOXIA (HCC): ICD-10-CM

## 2021-08-31 DIAGNOSIS — I27.20 PULMONARY HYPERTENSION (HCC): ICD-10-CM

## 2021-08-31 DIAGNOSIS — G47.34 NOCTURNAL HYPOXIA: ICD-10-CM

## 2021-08-31 DIAGNOSIS — E78.2 MIXED HYPERLIPIDEMIA: ICD-10-CM

## 2021-08-31 LAB
ALBUMIN SERPL BCP-MCNC: 4.2 G/DL (ref 3.2–4.9)
ALBUMIN/GLOB SERPL: 1.4 G/DL
ALP SERPL-CCNC: 63 U/L (ref 30–99)
ALT SERPL-CCNC: 18 U/L (ref 2–50)
ANION GAP SERPL CALC-SCNC: 13 MMOL/L (ref 7–16)
AST SERPL-CCNC: 20 U/L (ref 12–45)
BASOPHILS # BLD AUTO: 0.5 % (ref 0–1.8)
BASOPHILS # BLD: 0.03 K/UL (ref 0–0.12)
BILIRUB SERPL-MCNC: 0.4 MG/DL (ref 0.1–1.5)
BUN SERPL-MCNC: 27 MG/DL (ref 8–22)
CALCIUM SERPL-MCNC: 10.3 MG/DL (ref 8.5–10.5)
CHLORIDE SERPL-SCNC: 99 MMOL/L (ref 96–112)
CHOLEST SERPL-MCNC: 200 MG/DL (ref 100–199)
CO2 SERPL-SCNC: 28 MMOL/L (ref 20–33)
CREAT SERPL-MCNC: 0.9 MG/DL (ref 0.5–1.4)
EOSINOPHIL # BLD AUTO: 0.16 K/UL (ref 0–0.51)
EOSINOPHIL NFR BLD: 2.6 % (ref 0–6.9)
ERYTHROCYTE [DISTWIDTH] IN BLOOD BY AUTOMATED COUNT: 47 FL (ref 35.9–50)
FASTING STATUS PATIENT QL REPORTED: NORMAL
GLOBULIN SER CALC-MCNC: 3.1 G/DL (ref 1.9–3.5)
GLUCOSE SERPL-MCNC: 140 MG/DL (ref 65–99)
HCT VFR BLD AUTO: 39.5 % (ref 37–47)
HDLC SERPL-MCNC: 101 MG/DL
HGB BLD-MCNC: 12 G/DL (ref 12–16)
IMM GRANULOCYTES # BLD AUTO: 0.01 K/UL (ref 0–0.11)
IMM GRANULOCYTES NFR BLD AUTO: 0.2 % (ref 0–0.9)
LDLC SERPL CALC-MCNC: 85 MG/DL
LYMPHOCYTES # BLD AUTO: 1.76 K/UL (ref 1–4.8)
LYMPHOCYTES NFR BLD: 28.3 % (ref 22–41)
MCH RBC QN AUTO: 29.9 PG (ref 27–33)
MCHC RBC AUTO-ENTMCNC: 30.4 G/DL (ref 33.6–35)
MCV RBC AUTO: 98.3 FL (ref 81.4–97.8)
MONOCYTES # BLD AUTO: 0.47 K/UL (ref 0–0.85)
MONOCYTES NFR BLD AUTO: 7.6 % (ref 0–13.4)
NEUTROPHILS # BLD AUTO: 3.78 K/UL (ref 2–7.15)
NEUTROPHILS NFR BLD: 60.8 % (ref 44–72)
NRBC # BLD AUTO: 0 K/UL
NRBC BLD-RTO: 0 /100 WBC
POTASSIUM SERPL-SCNC: 4.7 MMOL/L (ref 3.6–5.5)
PROT SERPL-MCNC: 7.3 G/DL (ref 6–8.2)
RBC # BLD AUTO: 4.02 M/UL (ref 4.2–5.4)
SODIUM SERPL-SCNC: 140 MMOL/L (ref 135–145)
TRIGL SERPL-MCNC: 71 MG/DL (ref 0–149)
WBC # BLD AUTO: 6.2 K/UL (ref 4.8–10.8)

## 2021-08-31 PROCEDURE — 85018 HEMOGLOBIN: CPT

## 2021-08-31 PROCEDURE — 85014 HEMATOCRIT: CPT

## 2021-08-31 PROCEDURE — 80053 COMPREHEN METABOLIC PANEL: CPT

## 2021-08-31 PROCEDURE — 80061 LIPID PANEL: CPT

## 2021-08-31 PROCEDURE — 36415 COLL VENOUS BLD VENIPUNCTURE: CPT

## 2021-08-31 PROCEDURE — 85041 AUTOMATED RBC COUNT: CPT

## 2021-08-31 PROCEDURE — 85048 AUTOMATED LEUKOCYTE COUNT: CPT

## 2021-11-05 ENCOUNTER — TELEPHONE (OUTPATIENT)
Dept: SCHEDULING | Facility: IMAGING CENTER | Age: 86
End: 2021-11-05

## 2021-11-05 ENCOUNTER — TELEMEDICINE (OUTPATIENT)
Dept: CARDIOLOGY | Facility: MEDICAL CENTER | Age: 86
End: 2021-11-05
Payer: MEDICARE

## 2021-11-05 ENCOUNTER — TELEPHONE (OUTPATIENT)
Dept: CARDIOLOGY | Facility: MEDICAL CENTER | Age: 86
End: 2021-11-05

## 2021-11-05 VITALS
SYSTOLIC BLOOD PRESSURE: 136 MMHG | BODY MASS INDEX: 20.32 KG/M2 | HEART RATE: 80 BPM | WEIGHT: 119 LBS | HEIGHT: 64 IN | DIASTOLIC BLOOD PRESSURE: 60 MMHG

## 2021-11-05 DIAGNOSIS — J44.9 CHRONIC OBSTRUCTIVE PULMONARY DISEASE, UNSPECIFIED COPD TYPE (HCC): ICD-10-CM

## 2021-11-05 DIAGNOSIS — I10 ESSENTIAL HYPERTENSION: ICD-10-CM

## 2021-11-05 DIAGNOSIS — I27.20 PULMONARY HYPERTENSION (HCC): ICD-10-CM

## 2021-11-05 DIAGNOSIS — J96.11 CHRONIC RESPIRATORY FAILURE WITH HYPOXIA (HCC): ICD-10-CM

## 2021-11-05 DIAGNOSIS — E78.2 MIXED HYPERLIPIDEMIA: ICD-10-CM

## 2021-11-05 PROCEDURE — 99214 OFFICE O/P EST MOD 30 MIN: CPT | Mod: 95 | Performed by: INTERNAL MEDICINE

## 2021-11-05 RX ORDER — NITROFURANTOIN MACROCRYSTALS 100 MG/1
100 CAPSULE ORAL 2 TIMES DAILY
Qty: 10 CAPSULE | Refills: 0 | Status: SHIPPED | OUTPATIENT
Start: 2021-11-05

## 2021-11-05 RX ORDER — ROSUVASTATIN CALCIUM 10 MG/1
TABLET, COATED ORAL
Qty: 90 TABLET | Refills: 3 | Status: SHIPPED | OUTPATIENT
Start: 2021-11-05 | End: 2022-05-09 | Stop reason: SDUPTHER

## 2021-11-05 RX ORDER — LISINOPRIL 20 MG/1
TABLET ORAL
Qty: 90 TABLET | Refills: 3 | Status: SHIPPED | OUTPATIENT
Start: 2021-11-05 | End: 2022-05-09 | Stop reason: SDUPTHER

## 2021-11-05 RX ORDER — NITROFURANTOIN MACROCRYSTALS 100 MG/1
100 CAPSULE ORAL 2 TIMES DAILY
Qty: 10 CAPSULE | Refills: 0
Start: 2021-11-05 | End: 2021-11-05 | Stop reason: SDUPTHER

## 2021-11-05 ASSESSMENT — ENCOUNTER SYMPTOMS
CONSTITUTIONAL NEGATIVE: 1
PND: 0
DIZZINESS: 0
HEMOPTYSIS: 0
RESPIRATORY NEGATIVE: 1
CHILLS: 0
FEVER: 0
SPUTUM PRODUCTION: 0
CARDIOVASCULAR NEGATIVE: 1
LOSS OF CONSCIOUSNESS: 0
WHEEZING: 0
STRIDOR: 0
WEAKNESS: 0
PALPITATIONS: 0
SORE THROAT: 0
COUGH: 0
GASTROINTESTINAL NEGATIVE: 1
EYES NEGATIVE: 1
NEUROLOGICAL NEGATIVE: 1
ORTHOPNEA: 0
CLAUDICATION: 0
SHORTNESS OF BREATH: 0
MUSCULOSKELETAL NEGATIVE: 1
BRUISES/BLEEDS EASILY: 0

## 2021-11-05 NOTE — TELEPHONE ENCOUNTER
SOLE      Pt called and wanted to check on the medication nitrofurantoin macro crystals (MACRODANTIN) 100 MG Cap. Pt is needing this medication to go to [facility] instead of [facility].      [facility]  [address] [place], [address]  PH: [phone]      Thank you,  Denice MORRIS

## 2021-11-05 NOTE — TELEPHONE ENCOUNTER
Korey Forde M.D.  Lawrence Chowdhury R.N.  I forgot to ask her what pharmacy to send her marobid to  Can you call her and send it there.       Note  RO        Pt called and wanted to check on the medication nitrofurantoin macro crystals (MACRODANTIN) 100 MG Cap. Pt is needing this medication to go to [facility] instead of [facility].        [facility]  [address] [place], [address]  PH: [phone]        Thank you,  Denice DUMAS/JOHN pt, confirmed [facility], RX sent

## 2021-11-05 NOTE — PROGRESS NOTES
Chief Complaint   Patient presents with   • Hyperlipidemia   • Hypertension     F/V Dx: Pulmonary hypertension (HCC)       Subjective:   Nieves Busch is a 88 y.o. female who presents today as a follow-up for a cavity hypertension hyperlipidemia and COPD.  Since she was last seen she has been doing well.  She has no chest pain or shortness of breath.  Her lower extremity edema is stable.  She is having some issues with dental pain but is otherwise recovering well.    Past Medical History:   Diagnosis Date   • Essential hypertension 9/25/2015     Past Surgical History:   Procedure Laterality Date   • CATARACT PHACO WITH IOL  8/20/2013    Performed by Wagner Gomez M.D. at [facility]   • CATARACT PHACO WITH IOL  8/6/2013    Performed by Wagner Gomez M.D. at [facility]     Family History   Problem Relation Age of Onset   • Cancer Daughter    • Cancer Father         Lungs   • Other Father         Tobbacco   • Alcohol/Drug Mother      Social History     Socioeconomic History   • Marital status:      Spouse name: Not on file   • Number of children: Not on file   • Years of education: Not on file   • Highest education level: Not on file   Occupational History   • Not on file   Tobacco Use   • Smoking status: Never Smoker   • Smokeless tobacco: Never Used   • Tobacco comment: significant 2nd hand smoke exposure.    Vaping Use   • Vaping Use: Never used   Substance and Sexual Activity   • Alcohol use: No     Alcohol/week: 0.0 oz   • Drug use: No   • Sexual activity: Not Currently     Partners: Male     Comment:     Other Topics Concern   • Not on file   Social History Narrative   • Not on file     Social Determinants of Health     Financial Resource Strain:    • Difficulty of Paying Living Expenses:    Food Insecurity:    • Worried About Running Out of Food in the Last Year:    • Ran Out of Food in the Last Year:    Transportation Needs:    • Lack of Transportation  (Medical):    • Lack of Transportation (Non-Medical):    Physical Activity:    • Days of Exercise per Week:    • Minutes of Exercise per Session:    Stress:    • Feeling of Stress :    Social Connections:    • Frequency of Communication with Friends and Family:    • Frequency of Social Gatherings with Friends and Family:    • Attends Yazdan[employer] Services:    • Active Member of Clubs or Organizations:    • Attends Club or Organization Meetings:    • Marital Status:    Intimate Partner Violence:    • Fear of Current or Ex-Partner:    • Emotionally Abused:    • Physically Abused:    • Sexually Abused:      No Known Allergies  Outpatient Encounter Medications as of 11/5/2021   Medication Sig Dispense Refill   • rosuvastatin (CRESTOR) 10 MG Tab TAKE 1 TABLET EVERY EVENING (GET LABS DONE) 90 Tablet 3   • lisinopril (PRINIVIL) 20 MG Tab TAKE 1 TABLET DAILY 90 Tablet 3   • albuterol 108 (90 Base) MCG/ACT Aero Soln inhalation aerosol Inhale 2 Puffs by mouth every 6 hours as needed for Shortness of Breath. 3 Each 3   • alendronate (FOSAMAX) 70 MG Tab TAKE 1 TABLET EVERY 7 DAYS 12 Tab 3   • ANORO ELLIPTA 62.5-25 MCG/INH AEROSOL POWDER, BREATH ACTIVATED inhaler USE 1 INHALATION DAILY 3 Each 3   • Cholecalciferol (VITAMIN D-3) 5000 UNITS Tab Take  by mouth.     • aspirin 81 MG tablet Take 81 mg by mouth every day.     • [DISCONTINUED] rosuvastatin (CRESTOR) 10 MG Tab TAKE 1 TABLET EVERY EVENING (GET LABS DONE) 90 tablet 3   • [DISCONTINUED] lisinopril (PRINIVIL) 20 MG Tab TAKE 1 TABLET DAILY 90 tablet 3   • FLUZONE HIGH-DOSE 0.5 ML Suspension Prefilled Syringe injection TO BE ADMINISTERED BY PHARMACIST FOR IMMUNIZATION  0     No facility-administered encounter medications on file as of 11/5/2021.     Review of Systems   Constitutional: Negative.  Negative for chills, fever and malaise/fatigue.   HENT: Negative.  Negative for sore throat.    Eyes: Negative.    Respiratory: Negative.  Negative for cough, hemoptysis, sputum  "production, shortness of breath, wheezing and stridor.    Cardiovascular: Negative.  Negative for chest pain, palpitations, orthopnea, claudication, leg swelling and PND.   Gastrointestinal: Negative.    Genitourinary: Negative.    Musculoskeletal: Negative.    Skin: Negative.    Neurological: Negative.  Negative for dizziness, loss of consciousness and weakness.   Endo/Heme/Allergies: Negative.  Does not bruise/bleed easily.   All other systems reviewed and are negative.       Objective:   /60   Pulse 80   Ht 1.626 m (5' 4\")   Wt 54 kg (119 lb)   BMI 20.43 kg/m²     Physical Exam  Vitals and nursing note reviewed.   Constitutional:       General: She is not in acute distress.     Appearance: She is well-developed. She is not diaphoretic.   HENT:      Head: Normocephalic and atraumatic.      Right Ear: External ear normal.      Left Ear: External ear normal.      Nose: Nose normal.      Mouth/Throat:      Pharynx: No oropharyngeal exudate.   Eyes:      General: No scleral icterus.        Right eye: No discharge.         Left eye: No discharge.      Conjunctiva/sclera: Conjunctivae normal.      Pupils: Pupils are equal, round, and reactive to light.   Neck:      Vascular: No JVD.   Cardiovascular:      Rate and Rhythm: Normal rate and regular rhythm.      Heart sounds: No murmur heard.   No friction rub. No gallop.    Pulmonary:      Effort: Pulmonary effort is normal. No respiratory distress.      Breath sounds: No stridor. No wheezing or rales.   Chest:      Chest wall: No tenderness.   Abdominal:      General: There is no distension.      Palpations: Abdomen is soft.      Tenderness: There is no guarding.   Musculoskeletal:         General: No tenderness or deformity. Normal range of motion.      Cervical back: Neck supple.   Skin:     General: Skin is warm and dry.      Coloration: Skin is not pale.      Findings: No erythema or rash.   Neurological:      Mental Status: She is alert.      Cranial " Nerves: No cranial nerve deficit.      Motor: No abnormal muscle tone.      Coordination: Coordination normal.      Deep Tendon Reflexes: Reflexes are normal and symmetric. Reflexes normal.   Psychiatric:         Behavior: Behavior normal.         Thought Content: Thought content normal.         Judgment: Judgment normal.       Echocardiogram: Dated 12/27/2017 personally viewed interpreted myself showing EF of 60% no valvular heart disease mildly elevated RV systolic pressure 40.      Blood work as below and reviewed with the patient.  Lab Results   Component Value Date/Time    CHOLSTRLTOT 200 (H) 08/31/2021 07:37 AM    LDL 85 08/31/2021 07:37 AM     08/31/2021 07:37 AM    TRIGLYCERIDE 71 08/31/2021 07:37 AM       Lab Results   Component Value Date/Time    SODIUM 140 08/31/2021 07:37 AM    POTASSIUM 4.7 08/31/2021 07:37 AM    CHLORIDE 99 08/31/2021 07:37 AM    CO2 28 08/31/2021 07:37 AM    GLUCOSE 140 (H) 08/31/2021 07:37 AM    BUN 27 (H) 08/31/2021 07:37 AM    CREATININE 0.90 08/31/2021 07:37 AM    BUNCREATRAT 24 10/12/2016 08:34 AM     Lab Results   Component Value Date/Time    ALKPHOSPHAT 63 08/31/2021 07:37 AM    ASTSGOT 20 08/31/2021 07:37 AM    ALTSGPT 18 08/31/2021 07:37 AM    TBILIRUBIN 0.4 08/31/2021 07:37 AM        Assessment:     1. Mixed hyperlipidemia  rosuvastatin (CRESTOR) 10 MG Tab   2. Pulmonary hypertension (HCC)     3. Chronic obstructive pulmonary disease, unspecified COPD type (HCC)     4. Chronic respiratory failure with hypoxia (HCC)     5. Essential hypertension  lisinopril (PRINIVIL) 20 MG Tab       Medical Decision Making:  Today's Assessment / Status / Plan:     88-year-old female with cor pulmonale hypertension hyperlipidemia and high risk medication usage.  I'm happy with how she is doing.  I refilled her medications.  I will see her back in 6 months.     COPD (chronic obstructive pulmonary disease)  Cont O2     Hyperlipidemia  Cont rosuva to 10     Nocturnal hypoxia  Cont  o2     Pulmonary hypertension  Cont O2  Cont inhalers  F/U Pulm    Start time: 9:15  Stop time: 9:25    This evaluation was conducted via Zoom using secure and encrypted videoconferencing technology. The patient was in a private location in the [facility].  The patient's identity was confirmed and verbal consent was obtained for this virtual visit.

## 2022-05-09 ENCOUNTER — TELEMEDICINE (OUTPATIENT)
Dept: CARDIOLOGY | Facility: MEDICAL CENTER | Age: 87
End: 2022-05-09
Payer: MEDICARE

## 2022-05-09 VITALS
HEIGHT: 64 IN | BODY MASS INDEX: 20.49 KG/M2 | DIASTOLIC BLOOD PRESSURE: 60 MMHG | WEIGHT: 120 LBS | HEART RATE: 91 BPM | SYSTOLIC BLOOD PRESSURE: 130 MMHG

## 2022-05-09 DIAGNOSIS — I27.20 PULMONARY HYPERTENSION (HCC): ICD-10-CM

## 2022-05-09 DIAGNOSIS — J44.9 CHRONIC OBSTRUCTIVE PULMONARY DISEASE, UNSPECIFIED COPD TYPE (HCC): ICD-10-CM

## 2022-05-09 DIAGNOSIS — I10 ESSENTIAL HYPERTENSION: ICD-10-CM

## 2022-05-09 DIAGNOSIS — E78.2 MIXED HYPERLIPIDEMIA: ICD-10-CM

## 2022-05-09 PROCEDURE — 99214 OFFICE O/P EST MOD 30 MIN: CPT | Mod: 95 | Performed by: INTERNAL MEDICINE

## 2022-05-09 RX ORDER — LISINOPRIL 20 MG/1
TABLET ORAL
Qty: 90 TABLET | Refills: 3 | Status: SHIPPED | OUTPATIENT
Start: 2022-05-09 | End: 2023-06-20 | Stop reason: SDUPTHER

## 2022-05-09 RX ORDER — ROSUVASTATIN CALCIUM 10 MG/1
TABLET, COATED ORAL
Qty: 90 TABLET | Refills: 3 | Status: SHIPPED | OUTPATIENT
Start: 2022-05-09 | End: 2023-05-04 | Stop reason: SDUPTHER

## 2022-05-09 ASSESSMENT — ENCOUNTER SYMPTOMS
CLAUDICATION: 0
MUSCULOSKELETAL NEGATIVE: 1
RESPIRATORY NEGATIVE: 1
ORTHOPNEA: 0
WEAKNESS: 0
WHEEZING: 0
PALPITATIONS: 0
GASTROINTESTINAL NEGATIVE: 1
LOSS OF CONSCIOUSNESS: 0
CONSTITUTIONAL NEGATIVE: 1
SPUTUM PRODUCTION: 0
SHORTNESS OF BREATH: 0
SORE THROAT: 0
PND: 0
CHILLS: 0
NEUROLOGICAL NEGATIVE: 1
BRUISES/BLEEDS EASILY: 0
HEMOPTYSIS: 0
CARDIOVASCULAR NEGATIVE: 1
FEVER: 0
EYES NEGATIVE: 1
COUGH: 0
STRIDOR: 0
DIZZINESS: 0

## 2022-05-09 NOTE — PROGRESS NOTES
Chief Complaint   Patient presents with   • Hyperlipidemia   • Pulmonary Hypertension       Subjective:   Nieves Busch is a 88 y.o. female who presents today as a follow-up for a cavity hypertension hyperlipidemia and COPD.  As always she has been doing well with no chest pain shortness of breath or PND.  Her blood pressure is controlled.  Last labs were performed in August which I reviewed.  She continues on the same medications no issues.    Past Medical History:   Diagnosis Date   • Essential hypertension 9/25/2015     Past Surgical History:   Procedure Laterality Date   • CATARACT PHACO WITH IOL  8/20/2013    Performed by Wagner Gomez M.D. at [facility]   • CATARACT PHACO WITH IOL  8/6/2013    Performed by Wagner Gomez M.D. at [facility]     Family History   Problem Relation Age of Onset   • Cancer Daughter    • Cancer Father         Lungs   • Other Father         Tobbacco   • Alcohol/Drug Mother      Social History     Socioeconomic History   • Marital status:      Spouse name: Not on file   • Number of children: Not on file   • Years of education: Not on file   • Highest education level: Not on file   Occupational History   • Not on file   Tobacco Use   • Smoking status: Never Smoker   • Smokeless tobacco: Never Used   • Tobacco comment: significant 2nd hand smoke exposure.    Vaping Use   • Vaping Use: Never used   Substance and Sexual Activity   • Alcohol use: No     Alcohol/week: 0.0 oz   • Drug use: No   • Sexual activity: Not Currently     Partners: Male     Comment:     Other Topics Concern   • Not on file   Social History Narrative   • Not on file     Social Determinants of Health     Financial Resource Strain: Not on file   Food Insecurity: Not on file   Transportation Needs: Not on file   Physical Activity: Not on file   Stress: Not on file   Social Connections: Not on file   Intimate Partner Violence: Not on file   Housing Stability: Not  on file     No Known Allergies  Outpatient Encounter Medications as of 5/9/2022   Medication Sig Dispense Refill   • rosuvastatin (CRESTOR) 10 MG Tab TAKE 1 TABLET EVERY EVENING (GET LABS DONE) 90 Tablet 3   • lisinopril (PRINIVIL) 20 MG Tab TAKE 1 TABLET DAILY 90 Tablet 3   • albuterol 108 (90 Base) MCG/ACT Aero Soln inhalation aerosol Inhale 2 Puffs by mouth every 6 hours as needed for Shortness of Breath. 3 Each 3   • ANORO ELLIPTA 62.5-25 MCG/INH AEROSOL POWDER, BREATH ACTIVATED inhaler USE 1 INHALATION DAILY 3 Each 3   • FLUZONE HIGH-DOSE 0.5 ML Suspension Prefilled Syringe injection TO BE ADMINISTERED BY PHARMACIST FOR IMMUNIZATION  0   • Cholecalciferol (VITAMIN D-3) 5000 UNITS Tab Take  by mouth.     • aspirin 81 MG tablet Take 81 mg by mouth every day.     • nitrofurantoin macro crystals (MACRODANTIN) 100 MG Cap Take 1 Capsule by mouth 2 times a day. 10 Capsule 0   • [DISCONTINUED] rosuvastatin (CRESTOR) 10 MG Tab TAKE 1 TABLET EVERY EVENING (GET LABS DONE) 90 Tablet 3   • [DISCONTINUED] lisinopril (PRINIVIL) 20 MG Tab TAKE 1 TABLET DAILY 90 Tablet 3   • alendronate (FOSAMAX) 70 MG Tab TAKE 1 TABLET EVERY 7 DAYS 12 Tab 3     No facility-administered encounter medications on file as of 5/9/2022.     Review of Systems   Constitutional: Negative.  Negative for chills, fever and malaise/fatigue.   HENT: Negative.  Negative for sore throat.    Eyes: Negative.    Respiratory: Negative.  Negative for cough, hemoptysis, sputum production, shortness of breath, wheezing and stridor.    Cardiovascular: Negative.  Negative for chest pain, palpitations, orthopnea, claudication, leg swelling and PND.   Gastrointestinal: Negative.    Genitourinary: Negative.    Musculoskeletal: Negative.    Skin: Negative.    Neurological: Negative.  Negative for dizziness, loss of consciousness and weakness.   Endo/Heme/Allergies: Negative.  Does not bruise/bleed easily.   All other systems reviewed and are negative.       Objective:  "  /60 (BP Location: Left arm, Patient Position: Sitting, BP Cuff Size: Adult)   Pulse 91   Ht 1.626 m (5' 4\")   Wt 54.4 kg (120 lb)   BMI 20.60 kg/m²     Physical Exam  Vitals and nursing note reviewed.   Constitutional:       General: She is not in acute distress.     Appearance: She is well-developed. She is not diaphoretic.   HENT:      Head: Normocephalic and atraumatic.      Right Ear: External ear normal.      Left Ear: External ear normal.      Nose: Nose normal.      Mouth/Throat:      Pharynx: No oropharyngeal exudate.   Eyes:      General: No scleral icterus.        Right eye: No discharge.         Left eye: No discharge.      Conjunctiva/sclera: Conjunctivae normal.      Pupils: Pupils are equal, round, and reactive to light.   Neck:      Vascular: No JVD.   Cardiovascular:      Rate and Rhythm: Normal rate and regular rhythm.      Heart sounds: No murmur heard.    No friction rub. No gallop.   Pulmonary:      Effort: Pulmonary effort is normal. No respiratory distress.      Breath sounds: No stridor. No wheezing or rales.   Chest:      Chest wall: No tenderness.   Abdominal:      General: There is no distension.      Palpations: Abdomen is soft.      Tenderness: There is no guarding.   Musculoskeletal:         General: No tenderness or deformity. Normal range of motion.      Cervical back: Neck supple.   Skin:     General: Skin is warm and dry.      Coloration: Skin is not pale.      Findings: No erythema or rash.   Neurological:      Mental Status: She is alert.      Cranial Nerves: No cranial nerve deficit.      Motor: No abnormal muscle tone.      Coordination: Coordination normal.      Deep Tendon Reflexes: Reflexes are normal and symmetric. Reflexes normal.   Psychiatric:         Behavior: Behavior normal.         Thought Content: Thought content normal.         Judgment: Judgment normal.       Echocardiogram: Dated 12/27/2017 personally viewed interpreted myself showing EF of 60% no " valvular heart disease mildly elevated RV systolic pressure 40.      Blood work as below and reviewed with the patient.  Lab Results   Component Value Date/Time    CHOLSTRLTOT 200 (H) 08/31/2021 07:37 AM    LDL 85 08/31/2021 07:37 AM     08/31/2021 07:37 AM    TRIGLYCERIDE 71 08/31/2021 07:37 AM       Lab Results   Component Value Date/Time    SODIUM 140 08/31/2021 07:37 AM    POTASSIUM 4.7 08/31/2021 07:37 AM    CHLORIDE 99 08/31/2021 07:37 AM    CO2 28 08/31/2021 07:37 AM    GLUCOSE 140 (H) 08/31/2021 07:37 AM    BUN 27 (H) 08/31/2021 07:37 AM    CREATININE 0.90 08/31/2021 07:37 AM    BUNCREATRAT 24 10/12/2016 08:34 AM     Lab Results   Component Value Date/Time    ALKPHOSPHAT 63 08/31/2021 07:37 AM    ASTSGOT 20 08/31/2021 07:37 AM    ALTSGPT 18 08/31/2021 07:37 AM    TBILIRUBIN 0.4 08/31/2021 07:37 AM        Assessment:     1. Mixed hyperlipidemia  rosuvastatin (CRESTOR) 10 MG Tab   2. Chronic obstructive pulmonary disease, unspecified COPD type (HCC)     3. Pulmonary hypertension (HCC)     4. Essential hypertension  lisinopril (PRINIVIL) 20 MG Tab       Medical Decision Making:  Today's Assessment / Status / Plan:     88-year-old female with cor pulmonale hypertension hyperlipidemia and high risk medication usage.  I have refilled her medications.  I am happy with how she is doing.  I will see her back in 6 months.     COPD (chronic obstructive pulmonary disease)  Cont O2     Hyperlipidemia  Cont rosuva to 10     Nocturnal hypoxia  Cont o2     Pulmonary hypertension  Cont O2  Cont inhalers  F/U Pulm    Start time: 10:15  Stop time: 10:30    This evaluation was conducted via Zoom using secure and encrypted videoconferencing technology. The patient was in a private location in the [facility].  The patient's identity was confirmed and verbal consent was obtained for this virtual visit.

## 2022-11-03 ENCOUNTER — PATIENT MESSAGE (OUTPATIENT)
Dept: HEALTH INFORMATION MANAGEMENT | Facility: OTHER | Age: 87
End: 2022-11-03

## 2023-01-27 NOTE — LETTER
January 10, 2018        Nieves Busch  [address] [place] [address]          Dear Nieves,    We have received the results of your recent Echocardiogram on 12/27.    Your test came back looking better per Dr. Forde.    Please follow up as previously discussed with your physician.      Appt with Dr. Forde May 2018 (not yet scheduled).    Feel free to call us with any questions.        Sincerely,        [facility]  Electronically Signed    
Detail Level: Zone

## 2023-05-04 DIAGNOSIS — E78.2 MIXED HYPERLIPIDEMIA: ICD-10-CM

## 2023-05-04 RX ORDER — ROSUVASTATIN CALCIUM 10 MG/1
TABLET, COATED ORAL
Qty: 90 TABLET | Refills: 0 | Status: SHIPPED | OUTPATIENT
Start: 2023-05-04

## 2023-05-04 NOTE — TELEPHONE ENCOUNTER
Is the patient due for a refill? Yes- courtesy refill    Was the patient seen the past year? Yes    Date of last office visit: 5/9/2022    Does the patient have an upcoming appointment?  No   If yes, When?     Provider to refill:NAYELY, ADD for RO    Does the patients insurance require a 100 day supply?  No

## 2023-06-20 DIAGNOSIS — I10 ESSENTIAL HYPERTENSION: ICD-10-CM

## 2023-06-20 RX ORDER — LISINOPRIL 20 MG/1
TABLET ORAL
Qty: 90 TABLET | Refills: 0 | Status: SHIPPED | OUTPATIENT
Start: 2023-06-20

## 2023-06-20 NOTE — TELEPHONE ENCOUNTER
Is the patient due for a refill? Yes- courtesy refill    Was the patient seen the past year? No    Date of last office visit: 5/9/2022    Does the patient have an upcoming appointment?  No   If yes, When?     Provider to refill:REENA, ADD for RO    Does the patients insurance require a 100 day supply?  No

## 2023-08-14 ENCOUNTER — TELEPHONE (OUTPATIENT)
Dept: CARDIOLOGY | Facility: MEDICAL CENTER | Age: 88
End: 2023-08-14
Payer: MEDICARE

## 2023-08-14 NOTE — TELEPHONE ENCOUNTER
SOLE        Caller: Abril with [facility]    Medication Name and Dosage: rosuvastatin (CRESTOR) 10 MG Tab        Please call your pharmacy and have them send us a refill request or speak to a live representative, RX number may have changed.    Medication amount left: 0    Preferred Pharmacy: [facility] [address]  Fax# [phone]    Other questions (Topic): n/a    Callback Number (Will only call for issues): [phone]    Thank you    -Brennon DUMAS

## 2023-08-15 NOTE — TELEPHONE ENCOUNTER
Phone Number Called: [phone]    Call outcome:  No Voicemail left/ No Answering Machine    Message: Called to discuss [facility] for refill  Will await phone call return